# Patient Record
Sex: FEMALE | Race: WHITE | Employment: FULL TIME | URBAN - METROPOLITAN AREA
[De-identification: names, ages, dates, MRNs, and addresses within clinical notes are randomized per-mention and may not be internally consistent; named-entity substitution may affect disease eponyms.]

---

## 2018-06-14 PROBLEM — E66.01 SEVERE OBESITY (BMI 35.0-39.9): Status: ACTIVE | Noted: 2018-06-14

## 2020-06-30 PROBLEM — Z34.01 PRIMIGRAVIDA IN FIRST TRIMESTER: Status: ACTIVE | Noted: 2020-06-23

## 2020-08-14 PROBLEM — U07.1 COVID-19: Status: ACTIVE | Noted: 2020-07-16

## 2020-10-29 PROBLEM — O99.212 OBESITY AFFECTING PREGNANCY IN SECOND TRIMESTER: Status: ACTIVE | Noted: 2018-06-14

## 2020-10-29 PROBLEM — Z34.02 PRIMIGRAVIDA IN SECOND TRIMESTER: Status: ACTIVE | Noted: 2020-06-23

## 2020-10-29 PROBLEM — D25.9 UTERINE FIBROID IN PREGNANCY: Status: ACTIVE | Noted: 2020-10-29

## 2020-10-29 PROBLEM — O34.10 UTERINE FIBROID IN PREGNANCY: Status: ACTIVE | Noted: 2020-10-29

## 2021-01-25 ENCOUNTER — HOSPITAL ENCOUNTER (INPATIENT)
Age: 31
LOS: 3 days | Discharge: HOME | End: 2021-01-28
Attending: OBSTETRICS & GYNECOLOGY | Admitting: OBSTETRICS & GYNECOLOGY
Payer: COMMERCIAL

## 2021-01-25 DIAGNOSIS — Z98.890 POST-OPERATIVE STATE: Primary | ICD-10-CM

## 2021-01-25 DIAGNOSIS — O13.3 GESTATIONAL HYPERTENSION, THIRD TRIMESTER: ICD-10-CM

## 2021-01-25 PROBLEM — O41.03X0 OLIGOHYDRAMNIOS IN THIRD TRIMESTER: Status: ACTIVE | Noted: 2021-01-25

## 2021-01-25 PROCEDURE — 65270000029 HC RM PRIVATE

## 2021-01-25 PROCEDURE — 74011250636 HC RX REV CODE- 250/636: Performed by: OBSTETRICS & GYNECOLOGY

## 2021-01-25 RX ORDER — ZOLPIDEM TARTRATE 5 MG/1
5 TABLET ORAL
Status: DISCONTINUED | OUTPATIENT
Start: 2021-01-25 | End: 2021-01-27

## 2021-01-25 RX ORDER — SODIUM CHLORIDE, SODIUM LACTATE, POTASSIUM CHLORIDE, CALCIUM CHLORIDE 600; 310; 30; 20 MG/100ML; MG/100ML; MG/100ML; MG/100ML
125 INJECTION, SOLUTION INTRAVENOUS CONTINUOUS
Status: DISCONTINUED | OUTPATIENT
Start: 2021-01-25 | End: 2021-01-27

## 2021-01-25 RX ADMIN — SODIUM CHLORIDE, SODIUM LACTATE, POTASSIUM CHLORIDE, AND CALCIUM CHLORIDE 125 ML/HR: 600; 310; 30; 20 INJECTION, SOLUTION INTRAVENOUS at 20:00

## 2021-01-26 ENCOUNTER — APPOINTMENT (OUTPATIENT)
Dept: ULTRASOUND IMAGING | Age: 31
End: 2021-01-26
Attending: OBSTETRICS & GYNECOLOGY
Payer: COMMERCIAL

## 2021-01-26 ENCOUNTER — ANESTHESIA (OUTPATIENT)
Dept: LABOR AND DELIVERY | Age: 31
End: 2021-01-26
Payer: COMMERCIAL

## 2021-01-26 ENCOUNTER — ANESTHESIA EVENT (OUTPATIENT)
Dept: LABOR AND DELIVERY | Age: 31
End: 2021-01-26
Payer: COMMERCIAL

## 2021-01-26 PROBLEM — O13.3 GESTATIONAL HYPERTENSION, THIRD TRIMESTER: Status: ACTIVE | Noted: 2021-01-26

## 2021-01-26 PROBLEM — O41.00X0 OLIGOHYDRAMNIOS ANTEPARTUM: Status: ACTIVE | Noted: 2021-01-26

## 2021-01-26 LAB
ABO + RH BLD: NORMAL
ARTERIAL PATENCY WRIST A: ABNORMAL
ARTERIAL PATENCY WRIST A: ABNORMAL
BASE DEFICIT BLD-SCNC: 3 MMOL/L
BASE DEFICIT BLD-SCNC: 3 MMOL/L
BASOPHILS # BLD: 0 K/UL (ref 0–0.2)
BASOPHILS NFR BLD: 0 % (ref 0–2)
BDY SITE: ABNORMAL
BDY SITE: ABNORMAL
BLOOD GROUP ANTIBODIES SERPL: NORMAL
CO2 BLD-SCNC: 24 MMOL/L
CO2 BLD-SCNC: 28 MMOL/L
COLLECT TIME,HTIME: 1240
COLLECT TIME,HTIME: 1240
DIFFERENTIAL METHOD BLD: ABNORMAL
EOSINOPHIL # BLD: 0 K/UL (ref 0–0.8)
EOSINOPHIL NFR BLD: 0 % (ref 0.5–7.8)
ERYTHROCYTE [DISTWIDTH] IN BLOOD BY AUTOMATED COUNT: 13 % (ref 11.9–14.6)
GAS FLOW.O2 O2 DELIVERY SYS: ABNORMAL L/MIN
GAS FLOW.O2 O2 DELIVERY SYS: ABNORMAL L/MIN
HCO3 BLD-SCNC: 25.9 MMOL/L (ref 22–26)
HCO3 BLDV-SCNC: 22.9 MMOL/L (ref 23–28)
HCT VFR BLD AUTO: 36.3 % (ref 35.8–46.3)
HGB BLD-MCNC: 12.3 G/DL (ref 11.7–15.4)
IMM GRANULOCYTES # BLD AUTO: 0 K/UL (ref 0–0.5)
IMM GRANULOCYTES NFR BLD AUTO: 0 % (ref 0–5)
LYMPHOCYTES # BLD: 2.1 K/UL (ref 0.5–4.6)
LYMPHOCYTES NFR BLD: 19 % (ref 13–44)
MCH RBC QN AUTO: 31 PG (ref 26.1–32.9)
MCHC RBC AUTO-ENTMCNC: 33.9 G/DL (ref 31.4–35)
MCV RBC AUTO: 91.4 FL (ref 79.6–97.8)
MONOCYTES # BLD: 0.6 K/UL (ref 0.1–1.3)
MONOCYTES NFR BLD: 5 % (ref 4–12)
NEUTS SEG # BLD: 8.2 K/UL (ref 1.7–8.2)
NEUTS SEG NFR BLD: 75 % (ref 43–78)
NRBC # BLD: 0 K/UL (ref 0–0.2)
PCO2 BLDCO: 43 MMHG (ref 32–68)
PCO2 BLDCO: 60 MMHG (ref 32–68)
PH BLDCO: 7.24 [PH] (ref 7.15–7.38)
PH BLDCO: 7.34 [PH] (ref 7.15–7.38)
PLATELET # BLD AUTO: 351 K/UL (ref 150–450)
PMV BLD AUTO: 10.5 FL (ref 9.4–12.3)
PO2 BLDCO: 10 MMHG
PO2 BLDCO: 19 MMHG
RBC # BLD AUTO: 3.97 M/UL (ref 4.05–5.2)
SAO2 % BLD: 8 % (ref 95–98)
SAO2 % BLDV: 26 % (ref 65–88)
SERVICE CMNT-IMP: ABNORMAL
SERVICE CMNT-IMP: ABNORMAL
SPECIMEN EXP DATE BLD: NORMAL
SPECIMEN TYPE: ABNORMAL
SPECIMEN TYPE: ABNORMAL
WBC # BLD AUTO: 10.9 K/UL (ref 4.3–11.1)

## 2021-01-26 PROCEDURE — 74011250636 HC RX REV CODE- 250/636: Performed by: REGISTERED NURSE

## 2021-01-26 PROCEDURE — 82803 BLOOD GASES ANY COMBINATION: CPT

## 2021-01-26 PROCEDURE — 76010000392 HC C SECN EA ADDL 0.5 HR: Performed by: OBSTETRICS & GYNECOLOGY

## 2021-01-26 PROCEDURE — 36415 COLL VENOUS BLD VENIPUNCTURE: CPT

## 2021-01-26 PROCEDURE — 74011250636 HC RX REV CODE- 250/636: Performed by: OBSTETRICS & GYNECOLOGY

## 2021-01-26 PROCEDURE — 77030002991 HC SUT QUILL SSPC -B: Performed by: OBSTETRICS & GYNECOLOGY

## 2021-01-26 PROCEDURE — 77030034696 HC CATH URETH FOL 2W BARD -A: Performed by: OBSTETRICS & GYNECOLOGY

## 2021-01-26 PROCEDURE — 2709999900 HC NON-CHARGEABLE SUPPLY

## 2021-01-26 PROCEDURE — 77030039266 HC ADH SKN EXOFIN S2SG -A: Performed by: OBSTETRICS & GYNECOLOGY

## 2021-01-26 PROCEDURE — 74011000250 HC RX REV CODE- 250: Performed by: ANESTHESIOLOGY

## 2021-01-26 PROCEDURE — 86901 BLOOD TYPING SEROLOGIC RH(D): CPT

## 2021-01-26 PROCEDURE — 77030003665 HC NDL SPN BBMI -A: Performed by: ANESTHESIOLOGY

## 2021-01-26 PROCEDURE — 77030032490 HC SLV COMPR SCD KNE COVD -B: Performed by: OBSTETRICS & GYNECOLOGY

## 2021-01-26 PROCEDURE — 75410000003 HC RECOV DEL/VAG/CSECN EA 0.5 HR: Performed by: OBSTETRICS & GYNECOLOGY

## 2021-01-26 PROCEDURE — 76060000078 HC EPIDURAL ANESTHESIA: Performed by: OBSTETRICS & GYNECOLOGY

## 2021-01-26 PROCEDURE — 76818 FETAL BIOPHYS PROFILE W/NST: CPT

## 2021-01-26 PROCEDURE — 77030040830 HC CATH URETH FOL MDII -A

## 2021-01-26 PROCEDURE — 65270000029 HC RM PRIVATE

## 2021-01-26 PROCEDURE — 76819 FETAL BIOPHYS PROFIL W/O NST: CPT | Performed by: OBSTETRICS & GYNECOLOGY

## 2021-01-26 PROCEDURE — 77030002888 HC SUT CHRMC J&J -A: Performed by: OBSTETRICS & GYNECOLOGY

## 2021-01-26 PROCEDURE — 74011250636 HC RX REV CODE- 250/636: Performed by: ANESTHESIOLOGY

## 2021-01-26 PROCEDURE — 77030031139 HC SUT VCRL2 J&J -A: Performed by: OBSTETRICS & GYNECOLOGY

## 2021-01-26 PROCEDURE — 74011000250 HC RX REV CODE- 250: Performed by: REGISTERED NURSE

## 2021-01-26 PROCEDURE — 74011250637 HC RX REV CODE- 250/637: Performed by: ANESTHESIOLOGY

## 2021-01-26 PROCEDURE — 77030007880 HC KT SPN EPDRL BBMI -B: Performed by: ANESTHESIOLOGY

## 2021-01-26 PROCEDURE — 85025 COMPLETE CBC W/AUTO DIFF WBC: CPT

## 2021-01-26 PROCEDURE — 2709999900 HC NON-CHARGEABLE SUPPLY: Performed by: OBSTETRICS & GYNECOLOGY

## 2021-01-26 PROCEDURE — 77030040361 HC SLV COMPR DVT MDII -B

## 2021-01-26 PROCEDURE — 76010000391 HC C SECN FIRST 1 HR: Performed by: OBSTETRICS & GYNECOLOGY

## 2021-01-26 RX ORDER — ONDANSETRON 2 MG/ML
4 INJECTION INTRAMUSCULAR; INTRAVENOUS
Status: ACTIVE | OUTPATIENT
Start: 2021-01-26 | End: 2021-01-27

## 2021-01-26 RX ORDER — NALOXONE HYDROCHLORIDE 0.4 MG/ML
0.2 INJECTION, SOLUTION INTRAMUSCULAR; INTRAVENOUS; SUBCUTANEOUS
Status: ACTIVE | OUTPATIENT
Start: 2021-01-26 | End: 2021-01-27

## 2021-01-26 RX ORDER — DIPHENHYDRAMINE HYDROCHLORIDE 50 MG/ML
12.5 INJECTION, SOLUTION INTRAMUSCULAR; INTRAVENOUS
Status: DISPENSED | OUTPATIENT
Start: 2021-01-26 | End: 2021-01-27

## 2021-01-26 RX ORDER — NALBUPHINE HYDROCHLORIDE 10 MG/ML
5 INJECTION, SOLUTION INTRAMUSCULAR; INTRAVENOUS; SUBCUTANEOUS
Status: DISCONTINUED | OUTPATIENT
Start: 2021-01-26 | End: 2021-01-28 | Stop reason: HOSPADM

## 2021-01-26 RX ORDER — OXYCODONE HYDROCHLORIDE 5 MG/1
10 TABLET ORAL
Status: DISPENSED | OUTPATIENT
Start: 2021-01-26 | End: 2021-01-27

## 2021-01-26 RX ORDER — KETOROLAC TROMETHAMINE 30 MG/ML
INJECTION, SOLUTION INTRAMUSCULAR; INTRAVENOUS AS NEEDED
Status: DISCONTINUED | OUTPATIENT
Start: 2021-01-26 | End: 2021-01-26 | Stop reason: HOSPADM

## 2021-01-26 RX ORDER — EPHEDRINE SULFATE/0.9% NACL/PF 50 MG/5 ML
SYRINGE (ML) INTRAVENOUS AS NEEDED
Status: DISCONTINUED | OUTPATIENT
Start: 2021-01-26 | End: 2021-01-26 | Stop reason: HOSPADM

## 2021-01-26 RX ORDER — TRISODIUM CITRATE DIHYDRATE AND CITRIC ACID MONOHYDRATE 500; 334 MG/5ML; MG/5ML
30 SOLUTION ORAL ONCE
Status: COMPLETED | OUTPATIENT
Start: 2021-01-26 | End: 2021-01-26

## 2021-01-26 RX ORDER — ONDANSETRON 2 MG/ML
INJECTION INTRAMUSCULAR; INTRAVENOUS AS NEEDED
Status: DISCONTINUED | OUTPATIENT
Start: 2021-01-26 | End: 2021-01-26 | Stop reason: HOSPADM

## 2021-01-26 RX ORDER — SODIUM CHLORIDE, SODIUM LACTATE, POTASSIUM CHLORIDE, CALCIUM CHLORIDE 600; 310; 30; 20 MG/100ML; MG/100ML; MG/100ML; MG/100ML
75 INJECTION, SOLUTION INTRAVENOUS CONTINUOUS
Status: DISCONTINUED | OUTPATIENT
Start: 2021-01-26 | End: 2021-01-28

## 2021-01-26 RX ORDER — MORPHINE SULFATE 0.5 MG/ML
INJECTION, SOLUTION EPIDURAL; INTRATHECAL; INTRAVENOUS
Status: COMPLETED | OUTPATIENT
Start: 2021-01-26 | End: 2021-01-26

## 2021-01-26 RX ORDER — OXYTOCIN/RINGER'S LACTATE 30/500 ML
PLASTIC BAG, INJECTION (ML) INTRAVENOUS
Status: DISCONTINUED | OUTPATIENT
Start: 2021-01-26 | End: 2021-01-26 | Stop reason: HOSPADM

## 2021-01-26 RX ORDER — CEFAZOLIN SODIUM/WATER 2 G/20 ML
2 SYRINGE (ML) INTRAVENOUS ONCE
Status: COMPLETED | OUTPATIENT
Start: 2021-01-26 | End: 2021-01-26

## 2021-01-26 RX ORDER — SODIUM CHLORIDE, SODIUM LACTATE, POTASSIUM CHLORIDE, CALCIUM CHLORIDE 600; 310; 30; 20 MG/100ML; MG/100ML; MG/100ML; MG/100ML
INJECTION, SOLUTION INTRAVENOUS
Status: DISCONTINUED | OUTPATIENT
Start: 2021-01-26 | End: 2021-01-26 | Stop reason: HOSPADM

## 2021-01-26 RX ORDER — KETOROLAC TROMETHAMINE 30 MG/ML
30 INJECTION, SOLUTION INTRAMUSCULAR; INTRAVENOUS
Status: DISPENSED | OUTPATIENT
Start: 2021-01-26 | End: 2021-01-27

## 2021-01-26 RX ORDER — BUPIVACAINE HYDROCHLORIDE 7.5 MG/ML
INJECTION, SOLUTION INTRASPINAL
Status: COMPLETED | OUTPATIENT
Start: 2021-01-26 | End: 2021-01-26

## 2021-01-26 RX ORDER — MORPHINE SULFATE 10 MG/ML
5 INJECTION, SOLUTION INTRAMUSCULAR; INTRAVENOUS
Status: ACTIVE | OUTPATIENT
Start: 2021-01-26 | End: 2021-01-27

## 2021-01-26 RX ADMIN — BUPIVACAINE HYDROCHLORIDE IN DEXTROSE 12.5 MG: 7.5 INJECTION, SOLUTION SUBARACHNOID at 12:29

## 2021-01-26 RX ADMIN — Medication 10 MG: at 12:26

## 2021-01-26 RX ADMIN — SODIUM CHLORIDE, SODIUM LACTATE, POTASSIUM CHLORIDE, AND CALCIUM CHLORIDE 500 ML: 600; 310; 30; 20 INJECTION, SOLUTION INTRAVENOUS at 22:41

## 2021-01-26 RX ADMIN — KETOROLAC TROMETHAMINE 30 MG: 30 INJECTION, SOLUTION INTRAMUSCULAR at 19:13

## 2021-01-26 RX ADMIN — NALBUPHINE HYDROCHLORIDE 5 MG: 10 INJECTION, SOLUTION INTRAMUSCULAR; INTRAVENOUS; SUBCUTANEOUS at 23:02

## 2021-01-26 RX ADMIN — PHENYLEPHRINE HYDROCHLORIDE 80 MCG: 10 INJECTION INTRAVENOUS at 12:25

## 2021-01-26 RX ADMIN — Medication 5 MG: at 13:21

## 2021-01-26 RX ADMIN — SODIUM CHLORIDE, SODIUM LACTATE, POTASSIUM CHLORIDE, AND CALCIUM CHLORIDE: 600; 310; 30; 20 INJECTION, SOLUTION INTRAVENOUS at 12:11

## 2021-01-26 RX ADMIN — Medication 2 G: at 12:05

## 2021-01-26 RX ADMIN — KETOROLAC TROMETHAMINE 30 MG: 30 INJECTION, SOLUTION INTRAMUSCULAR at 13:17

## 2021-01-26 RX ADMIN — SODIUM CHLORIDE, SODIUM LACTATE, POTASSIUM CHLORIDE, AND CALCIUM CHLORIDE 125 ML/HR: 600; 310; 30; 20 INJECTION, SOLUTION INTRAVENOUS at 03:45

## 2021-01-26 RX ADMIN — NALBUPHINE HYDROCHLORIDE 5 MG: 10 INJECTION, SOLUTION INTRAMUSCULAR; INTRAVENOUS; SUBCUTANEOUS at 16:47

## 2021-01-26 RX ADMIN — DIPHENHYDRAMINE HYDROCHLORIDE 12.5 MG: 50 INJECTION, SOLUTION INTRAMUSCULAR; INTRAVENOUS at 14:23

## 2021-01-26 RX ADMIN — ONDANSETRON 4 MG: 2 INJECTION INTRAMUSCULAR; INTRAVENOUS at 13:08

## 2021-01-26 RX ADMIN — SODIUM CITRATE AND CITRIC ACID MONOHYDRATE 30 ML: 500; 334 SOLUTION ORAL at 12:04

## 2021-01-26 RX ADMIN — MORPHINE SULFATE 0.15 MG: 0.5 INJECTION, SOLUTION EPIDURAL; INTRATHECAL; INTRAVENOUS at 12:29

## 2021-01-26 RX ADMIN — OXYTOCIN 500 ML/HR: 10 INJECTION, SOLUTION INTRAMUSCULAR; INTRAVENOUS at 12:41

## 2021-01-26 RX ADMIN — PHENYLEPHRINE HYDROCHLORIDE 160 MCG: 10 INJECTION INTRAVENOUS at 13:21

## 2021-01-26 RX ADMIN — OXYCODONE HYDROCHLORIDE 10 MG: 5 TABLET ORAL at 14:23

## 2021-01-26 RX ADMIN — SODIUM CHLORIDE, SODIUM LACTATE, POTASSIUM CHLORIDE, AND CALCIUM CHLORIDE 500 ML: 600; 310; 30; 20 INJECTION, SOLUTION INTRAVENOUS at 12:05

## 2021-01-26 NOTE — CONSULTS
[facility]  Chart Reviewed. BPP read-8/8, STEPHANI=7.5 cm. Now has Gestational HTN. Recommend proceeding with Delivery.

## 2021-01-26 NOTE — ROUTINE PROCESS
SBAR IN Report: Mother Verbal report received from Mikell Dakins RN on this patient, who is now being transferred from L&D (unit) for routine progression of care. The patient is wearing a green \"Anesthesia-Duramorph\" band. Report consisted of patient's Situation, Background, Assessment and Recommendations (SBAR).  ID bands were compared with the identification form, and verified with the patient and transferring nurse. Information from the SBAR and the [place] Report was reviewed with the transferring nurse; opportunity for questions and clarification provided.

## 2021-01-26 NOTE — PROGRESS NOTES
Per MFM recommend proceed with delivery today. Patient Vitals for the past 8 hrs:   Temp Pulse Resp BP   21 0732 98 °F (36.7 °C) 76 18 132/74   21 0621  67  (!) 153/83       Discussed with mom and dad. Proceed today with  section.      Maxine Chisholm MD  12:05 PM

## 2021-01-26 NOTE — L&D DELIVERY NOTE
Delivery Summary    Patient: Yossi Bran MRN: [id number]  SSN: [id number]    YOB: 1990  Age: 35 y.o. Sex: female        Information for the patient's :  Missy Neely [[id number]]       Labor Events:    Labor: No    Steroids:     Cervical Ripening Date/Time:       Cervical Ripening Type: None   Antibiotics During Labor:     Rupture Identifier:      Rupture Date/Time: 2021 12:38 PM   Rupture Type: AROM   Amniotic Fluid Volume: Moderate    Amniotic Fluid Description: Clear    Amniotic Fluid Odor: None    Induction: None       Induction Date/Time:        Indications for Induction:      Augmentation: None   Augmentation Date/Time:      Indications for Augmentation:     Labor complications: Additional complications:        Delivery Events:  Indications For Episiotomy:     Episiotomy:     Perineal Laceration(s):     Repaired:     Periurethral Laceration Location:      Repaired:     Labial Laceration Location:     Repaired:     Sulcal Laceration Location:     Repaired:     Vaginal Laceration Location:     Repaired:     Cervical Laceration Location:     Repaired:     Repair Suture:     Number of Repair Packets:     Estimated Blood Loss (ml):  ml   Quantitaive Blood Loss (ml):             Delivery Date: 2021    Delivery Time: 12:40 PM   Delivery Type: , Low Transverse     Details    Trial of Labor: No   Primary/Repeat: Primary   Priority: Routine   Indications:  Breech       Sex:  Male     Gestational Age: 38w3d  Delivery Clinician:  Isac Solo  Living Status: Living   Delivery Location: OR OR           APGARS  One minute Five minutes Ten minutes   Skin color: 1   1        Heart rate: 2   2        Grimace: 2   2        Muscle tone: 2   2        Breathin   2        Totals: 9   9          Presentation: Breech    Position:        Resuscitation Method:  Suctioning-bulb; Tactile Stimulation     Meconium Stained:        Cord Information: 3 Vessels  Complications: None  Cord around:    Delayed cord clamping? Yes  Cord clamped date/time:   Disposition of Cord Blood: Lab    Blood Gases Sent?: Yes    Placenta:  Date/Time: 2021 12:42 PM  Removal: Expressed      Appearance: Normal;Intact      Measurements:  Birth Weight: 7 lb 14.6 oz (3.59 kg)      Birth Length:        Head Circumference:        Chest Circumference:       Abdominal Girth:       Other Providers:   Hui COLLIER;MIRNA CALIXTO;BRYAN URIBE;GLAIDS LALA;THEODORE PIERCE;CLEMENT BARBOSA;MARCOS RIVERA;NALINI LOPEZ;LYNNETTE BRANNON, Obstetrician;Primary Nurse;Primary [place] Nurse;Respiratory Therapist;Neonatologist;Anesthesiologist;Crna;Scrub Tech;Scrub Tech             Group B Strep: No results found for: Jacqueline Rodriguez  Information for the patient's :  Fiona Robles [[id number]]   No results found for: ABORH, PCTABR, PCTDIG, BILI, ABORHEXT, ABORH     Recent Labs     21  1251 21  1248   PCO2CB 60 43   PO2CB 10 19   HCO3I 25.9  --    SO2I 8*  --    IBD 3 3   SPECTI ARTERIAL CORD VENOUS CORD   PHICB 7.24 7.34   ISITE CORD CORD   IDEV OTHER OTHER   IALLEN NOT APPLICABLE NOT APPLICABLE

## 2021-01-26 NOTE — ANESTHESIA PROCEDURE NOTES
Spinal Block    Start time: 1/26/2021 12:24 PM  End time: 1/26/2021 12:29 PM  Performed by: Clarke Felix MD  Authorized by: Clarke Felix MD     Pre-procedure:   Indications: primary anesthetic  Preanesthetic Checklist: patient identified, risks and benefits discussed, anesthesia consent, site marked, patient being monitored and timeout performed    Timeout Time: 12:24          Spinal Block:   Patient Position:  Seated  Prep Region:  Lumbar  Prep: DuraPrep      Location:  L3-4  Technique:  Single shot    Local Dose (mL):  3    Needle:   Needle Type:  Pencan  Needle Gauge:  25 G  Attempts:  1      Events: CSF confirmed, no blood with aspiration and no paresthesia        Assessment:  Insertion:  Uncomplicated  Patient tolerance:  Patient tolerated the procedure well with no immediate complications

## 2021-01-26 NOTE — PROGRESS NOTES
Admission assessment complete as noted. Patient oriented to room and unit. Plan of care reviewed and patient verbalizes understanding. Questions encouraged and answered. Patent encouraged to call for needs or concerns. Safety Teaching reviewed:   1. Hand hygiene prior to handling the infant. 2. Use of bulb syringe. 3. Bracelets with matching numbers are placed on mother and infant  4. An infant security tag  [facility]) is placed on the infant's ankle and monitored  5. All OB nurses wear pink Employee badges - do not give your baby to anyone without proper identification. 6. Never leave the baby alone in the room. 7. The infant should be placed on their back to sleep. on a firm mattress. No toys should be placed in the crib. (safe sleep video offered to view)  8. Never shake the baby (video offered to view)  9. Infant fall prevention - do not sleep with the baby, and place the baby in the crib while ambulating. 8. Mother and Baby Care booklet given to Mother.

## 2021-01-26 NOTE — PROGRESS NOTES
Morning assessment completed. Pt denies c/o pain or needs at this time. Currently NPO awaiting ultrasound for fluid level and desires c/s today. Instructed pt to call prn, verbalizes understanding.

## 2021-01-26 NOTE — H&P
History & Physical    Name: German Maldonado MRN: [id number]  SSN: [id number]    YOB: 1990  Age: 35 y.o. Sex: female      Subjective:     Reason for Admission:  Pregnancy and Oligohydramnios    History of Present Illness: Ms. Mark Iglesias is a 35 y.o.  female with an estimated gestational age of 36w4d with Estimated Date of Delivery: 21. Had an [address] at the office today. Incidental finding:  STEPHANI 6, BPP 8/. Scheduled for primary  Fri, 21 2/2 breech. Pregnancy has been uncomplicated by. Patient denies vaginal leaking of fluid . OB History    Para Term  AB Living   1 0 0 0 0 0   SAB TAB Ectopic Molar Multiple Live Births   0 0 0 0 0 0      # Outcome Date GA Lbr Davis/2nd Weight Sex Delivery Anes PTL Lv   1 Current              Past Medical History:   Diagnosis Date    Broken foot     right    COVID-19 2020     No past surgical history on file. Social History     Occupational History    Not on file   Tobacco Use    Smoking status: Never Smoker    Smokeless tobacco: Never Used   Substance and Sexual Activity    Alcohol use: No    Drug use: Never    Sexual activity: Yes     Partners: Male     Birth control/protection: None      Family History   Problem Relation Age of Onset    No Known Problems Mother     Hypertension Father     Thyroid Disease Father     Cancer Father         sarcoma    Thyroid Disease Sister     Heart Disease Maternal Grandmother     Elevated Lipids Maternal Grandmother     Heart Disease Maternal Grandfather     Elevated Lipids Maternal Grandfather     Diabetes Neg Hx        No Known Allergies  Prior to Admission medications    Medication Sig Start Date End Date Taking? Authorizing Provider   acetaminophen (Tylenol Extra Strength) 500 mg tablet Take  by mouth every six (6) hours as needed for Pain. Yes Provider, Historical   prenatal multivit-ca-min-fe-fa tab Take  by mouth.    Yes Provider, Historical        Review of Systems:  A comprehensive review of systems was negative except for that written in the History of Present Illness. Objective:     Vitals:    Vitals:    21   BP: (!) 140/88   Pulse: 73   Resp: 18   Temp: 97.6 °F (36.4 °C)      Temp (24hrs), Av.6 °F (36.4 °C), Min:97.6 °F (36.4 °C), Max:97.6 °F (36.4 °C)    BP  Min: 114/68  Max: 140/88     Physical Exam:  Patient without distress. Heart: Regular rate and rhythm, S1S2 present or without murmur or extra heart sounds  Lung: clear to auscultation throughout lung fields, no wheezes, no rales, no rhonchi and normal respiratory effort  Fundus: soft and non tender   Vagina:  No pooling, vaginal secretions Nitrazine neg. Neg ferning. Cervical Exam:  Closed/th/high/posterior/soft  Lower Extremities:  - Edema 1+   - Patellar Reflexes: 1+ bilaterally   - Clonus: absent     Membranes:  Intact        Lab/Data Review:  No results found for this or any previous visit (from the past 24 hour(s)). Assessment and Plan:     Principal Problem:    Oligohydramnios in third trimester (2021)    Active Problems:    Breech presentation (2021)      Overview:       2021; declined version at 37 wks      39 wks on . Dr. Everardo Carolina on call that day. [facility] for  that day per       [facility] and pt       EFW- 8lb2oz 79%, BPD- >99%, HC- 99%. AC 70%  STEPHANI- 6.2CM WITH DVP OF 2.6CM. BPP . BREECH PRESENTATION. 35 y.o.  , 38w3d, breech presentation, oligo. PE today:  Neg pooling, nitrazine, ferning  Per pt, had a busy weekend, was very active. CFM and left tilt BR w/ IVF overnight. Reassess STEPHANI tomorrow. If STEPHANI improves plan primary c-s w/ me this Fri @ 39 wk.      NPO post midnight.     - Oligohydramnios:  See above

## 2021-01-26 NOTE — ANESTHESIA PREPROCEDURE EVALUATION
Relevant Problems   No relevant active problems       Anesthetic History               Review of Systems / Medical History  Patient summary reviewed and pertinent labs reviewed    Pulmonary                   Neuro/Psych              Cardiovascular                  Exercise tolerance: >4 METS     GI/Hepatic/Renal                Endo/Other             Other Findings   Comments: COVID-19 7/2020           Physical Exam    Airway  Mallampati: II  TM Distance: > 6 cm  Neck ROM: normal range of motion   Mouth opening: Normal     Cardiovascular  Regular rate and rhythm,  S1 and S2 normal,  no murmur, click, rub, or gallop             Dental  No notable dental hx       Pulmonary  Breath sounds clear to auscultation               Abdominal         Other Findings            Anesthetic Plan    ASA: 3        Post-op pain plan if not by surgeon: intrathecal opiates      Anesthetic plan and risks discussed with: Patient

## 2021-01-26 NOTE — PROGRESS NOTES
Pt admitted to room 426 for obs. Iv started to left hand with LR running at 125ml/hr. Nst reactive. Consents signed. All pt questions answered at this time.

## 2021-01-26 NOTE — PROGRESS NOTES
Spoke with Dr Nella Mckeon about orders for pt. New orders received for NST BID, IV with LR at 125ml/hr and regular diet.

## 2021-01-26 NOTE — PROGRESS NOTES
SBAR OUT Report: Mother    Verbal report given to Norma Ranch (full name & credentials) on this patient, who is now being transferred to [facility]  for routine progression of care. The patient is not wearing a green \"Anesthesia-Duramorph\" band. Report consisted of patient's Situation, Background, Assessment and Recommendations (SBAR). [place] ID bands were compared with the identification form, and verified with the patient and receiving nurse. Information from the SBAR and the [facility] Report was reviewed with the receiving nurse; opportunity for questions and clarification provided.

## 2021-01-26 NOTE — OP NOTES
Section Delivery Operative Report       Patient: Viktoria Cleaning MRN: [id number]  SSN: [id number]    YOB: 1990  Age: 35 y.o. Sex: female       Date of Procedure: 2021     Preoperative Diagnosis: 38 wks 4 days; borderline oligohydramnios; PIH; breech presentation    Postoperative Diagnosis: same with delivery of viable male infant    Procedure: primary low transverse  section; patient is a  candidate    Anesthesia: Spinal    Prophylactic Antibiotics: ancef    Estimated Blood Loss:      Information for the patient's :  Khadijah Cordero [[id number]]   Delivery of a 7 lb 14.6 oz (3.59 kg) male infant on 2021 at 12:40 PM. Apgars were 9  and 9 . Umbilical Cord: 3 Vessels     Umbilical Cord Events: None     Placenta: Expressed removal with Normal;Intact appearance. Amniotic Fluid Volume: Moderate     Amniotic Fluid Description:  Clear         Specimens:   ID Type Source Tests Collected by Time Destination   1 :  Placenta   Maddy Dinero MD 2021 1250 Discarded               Complications:  none    Procedure Details: The patient was taken to the operating room, where Spinal anesthesia was administered and found to be adequate. Tejeda catheter had been placed using sterile technique. The patient was prepped and draped in the normal sterile fashion. The abdomen was entered using the Pfannenstiel technique. Fascia was incised. The peritoneum was entered sharply well superior to the bladder and extended with blunt dissection. The bladder blade was then inserted. The vesicouterine peritoneum was identified and entered sharply with Metzenbaum scissors. The bladder flap was then created sharply and the bladder blade was reinserted. A low transverse uterine incision was made with the scalpel and extended with blunt dissection. Amniotomy was performed and there was clear fluid. The breech was delivered per routine and then full delivery of the body and head.   The cord was clamped and cut and the baby was handed off to the waiting  care unit staff. Placenta was then expressed from the uterus. The uterus was exteriorized and cleared of all clots and debris. The uterine incision was closed with number 0 vicryl in running locking fashion and a second imbricating stitch . The ovaries and tubes were examined and found to be normal.  The posterior cul-de-sac was irrigated with warm normal saline. Good hemostasis was again reassured and the uterus was returned to the abdomen. The anterior pelvis was irrigated with warm normal saline and good hemostasis was again reassured throughout. Rectus muscles were reapproximated with interrupted figure of 8s with 0 vicryl. The fascia was closed with 0 vicryl in a running fashion. Good hemostasis was assured. The skin was closed with a barbed 3.0 monocryl in a subcuticular fashion. The patient tolerated the procedure well. Sponge, lap, and needle counts were correct times three and the patient and baby were taken to recovery/postpartum room in stable condition.     Signed By: Winsome Alcantara MD     2021

## 2021-01-26 NOTE — ANESTHESIA POSTPROCEDURE EVALUATION
Procedure(s):   SECTION.     spinal    Anesthesia Post Evaluation      Multimodal analgesia: multimodal analgesia used between 6 hours prior to anesthesia start to PACU discharge  Patient location during evaluation: floor  Patient participation: complete - patient participated  Level of consciousness: awake and alert  Pain management: adequate  Airway patency: patent  Anesthetic complications: no  Cardiovascular status: acceptable  Respiratory status: acceptable  Hydration status: acceptable  Post anesthesia nausea and vomiting:  none  Final Post Anesthesia Temperature Assessment:  Hypothermia (<36 degrees C)      INITIAL Post-op Vital signs:   Vitals Value Taken Time   /76 21 1547   Temp 36.4 °C (97.5 °F) 21 1339   Pulse 59 21 1547   Resp 18 21 1547   SpO2 97 % 21 1440

## 2021-01-26 NOTE — PROGRESS NOTES
BPP 8/8, STEPHANI 7.5  Per Dr. Sandra Garrett and sylvia, will schedule primary c/s today  Pt notified and aware; and agreeable

## 2021-01-27 LAB
HCT VFR BLD AUTO: 32.5 % (ref 35.8–46.3)
HGB BLD-MCNC: 10.9 G/DL (ref 11.7–15.4)

## 2021-01-27 PROCEDURE — 65270000029 HC RM PRIVATE

## 2021-01-27 PROCEDURE — 74011250636 HC RX REV CODE- 250/636: Performed by: ANESTHESIOLOGY

## 2021-01-27 PROCEDURE — 2709999900 HC NON-CHARGEABLE SUPPLY

## 2021-01-27 PROCEDURE — 74011250637 HC RX REV CODE- 250/637: Performed by: OBSTETRICS & GYNECOLOGY

## 2021-01-27 PROCEDURE — 74011250637 HC RX REV CODE- 250/637: Performed by: ANESTHESIOLOGY

## 2021-01-27 PROCEDURE — 36415 COLL VENOUS BLD VENIPUNCTURE: CPT

## 2021-01-27 PROCEDURE — 85018 HEMOGLOBIN: CPT

## 2021-01-27 RX ORDER — SODIUM CHLORIDE 0.9 % (FLUSH) 0.9 %
5-40 SYRINGE (ML) INJECTION EVERY 8 HOURS
Status: DISCONTINUED | OUTPATIENT
Start: 2021-01-27 | End: 2021-01-28

## 2021-01-27 RX ORDER — DOCUSATE SODIUM 100 MG/1
100 CAPSULE, LIQUID FILLED ORAL 2 TIMES DAILY
Status: DISCONTINUED | OUTPATIENT
Start: 2021-01-27 | End: 2021-01-28 | Stop reason: HOSPADM

## 2021-01-27 RX ORDER — IBUPROFEN 800 MG/1
800 TABLET ORAL
Status: DISCONTINUED | OUTPATIENT
Start: 2021-01-27 | End: 2021-01-28 | Stop reason: HOSPADM

## 2021-01-27 RX ORDER — DIPHENHYDRAMINE HCL 25 MG
25-50 CAPSULE ORAL
Status: DISCONTINUED | OUTPATIENT
Start: 2021-01-27 | End: 2021-01-28 | Stop reason: HOSPADM

## 2021-01-27 RX ORDER — SODIUM CHLORIDE 0.9 % (FLUSH) 0.9 %
5-40 SYRINGE (ML) INJECTION AS NEEDED
Status: DISCONTINUED | OUTPATIENT
Start: 2021-01-27 | End: 2021-01-28 | Stop reason: HOSPADM

## 2021-01-27 RX ORDER — ONDANSETRON 2 MG/ML
4 INJECTION INTRAMUSCULAR; INTRAVENOUS
Status: DISCONTINUED | OUTPATIENT
Start: 2021-01-27 | End: 2021-01-28 | Stop reason: HOSPADM

## 2021-01-27 RX ORDER — SIMETHICONE 80 MG
80 TABLET,CHEWABLE ORAL
Status: DISCONTINUED | OUTPATIENT
Start: 2021-01-27 | End: 2021-01-28 | Stop reason: HOSPADM

## 2021-01-27 RX ORDER — NALOXONE HYDROCHLORIDE 0.4 MG/ML
0.4 INJECTION, SOLUTION INTRAMUSCULAR; INTRAVENOUS; SUBCUTANEOUS AS NEEDED
Status: DISCONTINUED | OUTPATIENT
Start: 2021-01-27 | End: 2021-01-28 | Stop reason: HOSPADM

## 2021-01-27 RX ORDER — ZOLPIDEM TARTRATE 5 MG/1
10 TABLET ORAL
Status: DISCONTINUED | OUTPATIENT
Start: 2021-01-27 | End: 2021-01-28 | Stop reason: HOSPADM

## 2021-01-27 RX ORDER — OXYCODONE HYDROCHLORIDE 5 MG/1
5-10 TABLET ORAL
Status: DISCONTINUED | OUTPATIENT
Start: 2021-01-27 | End: 2021-01-28 | Stop reason: HOSPADM

## 2021-01-27 RX ORDER — DEXTROSE, SODIUM CHLORIDE, SODIUM LACTATE, POTASSIUM CHLORIDE, AND CALCIUM CHLORIDE 5; .6; .31; .03; .02 G/100ML; G/100ML; G/100ML; G/100ML; G/100ML
1000 INJECTION, SOLUTION INTRAVENOUS CONTINUOUS
Status: DISCONTINUED | OUTPATIENT
Start: 2021-01-27 | End: 2021-01-28

## 2021-01-27 RX ADMIN — OXYCODONE 10 MG: 5 TABLET ORAL at 18:11

## 2021-01-27 RX ADMIN — DOCUSATE SODIUM 100 MG: 100 CAPSULE ORAL at 18:11

## 2021-01-27 RX ADMIN — DIPHENHYDRAMINE HYDROCHLORIDE 12.5 MG: 50 INJECTION, SOLUTION INTRAMUSCULAR; INTRAVENOUS at 04:39

## 2021-01-27 RX ADMIN — IBUPROFEN 800 MG: 800 TABLET, FILM COATED ORAL at 18:11

## 2021-01-27 RX ADMIN — KETOROLAC TROMETHAMINE 30 MG: 30 INJECTION, SOLUTION INTRAMUSCULAR at 07:43

## 2021-01-27 RX ADMIN — OXYCODONE HYDROCHLORIDE 10 MG: 5 TABLET ORAL at 11:35

## 2021-01-27 RX ADMIN — NALBUPHINE HYDROCHLORIDE 5 MG: 10 INJECTION, SOLUTION INTRAMUSCULAR; INTRAVENOUS; SUBCUTANEOUS at 07:43

## 2021-01-27 RX ADMIN — KETOROLAC TROMETHAMINE 30 MG: 30 INJECTION, SOLUTION INTRAMUSCULAR at 01:13

## 2021-01-27 NOTE — PROGRESS NOTES
Chart reviewed - first time parent. SW met with patient/ while social distancing w/appropriate PPE.  provided education on [facility] Postpartum [place] Home Visit. At this time, [facility] is completing this  home visit telephonically due to social distancing. Family would like to participate in program.  Referral will be made at discharge. Patient given informational packet on  mood & anxiety disorders (resources/education). Family denies any additional needs from  at this time. Family has 's contact information should any needs/questions arise.     DIDI Gavin-MARLEN  [address] [facility]   [phone]

## 2021-01-27 NOTE — PROGRESS NOTES
Shift assessment complete as noted. Patient denies needs. Questions encouraged and answered. Encouraged to call for needs or concerns. Verbalizes understanding. no redness/no discharge

## 2021-01-27 NOTE — PROGRESS NOTES
Pt is S/P primary  at 38 4/7 weeks due to oligo, breech. No complaints today. Normal PO pain. No GI/ issues. Lochia < menses. No F/C. VITALS  Patient Vitals for the past 24 hrs:   Temp Pulse Resp BP SpO2   21 1044 97.7 °F (36.5 °C) 71 20 127/74 98 %   21 0710 97.7 °F (36.5 °C) 72 20 125/74 98 %   21 0430 97.6 °F (36.4 °C) 67 20 119/72 98 %   21 2355 98.3 °F (36.8 °C) 80 20 120/68 95 %   21 1950 98.5 °F (36.9 °C) 80 20 139/76 96 %   21 1745 98 °F (36.7 °C) 67 16 114/66    21 1645 98.6 °F (37 °C) 63 16 120/65 97 %   21 1547  (!) 59 18 138/76    21 1440  62  130/60 97 %   21 1435  65 18 131/62 97 %   21 1424  60 18 130/64 97 %   21 1410  (!) 58 18 127/66 97 %   21 1355  64 18 127/65 97 %   21 1339 97.5 °F (36.4 °C) 76 20 (!) 127/56 97 %        CV - RRR  LUNGS - CTA bilaterally  ABD - soft, NABS, appropriate tenderness, incision C/D/I  EXT - tr edema bilaterally    Labs:    Recent Results (from the past 24 hour(s))   CBC WITH AUTOMATED DIFF    Collection Time: 21 11:11 AM   Result Value Ref Range    WBC 10.9 4.3 - 11.1 K/uL    RBC 3.97 (L) 4.05 - 5.2 M/uL    HGB 12.3 11.7 - 15.4 g/dL    HCT 36.3 35.8 - 46.3 %    MCV 91.4 79.6 - 97.8 FL    MCH 31.0 26.1 - 32.9 PG    MCHC 33.9 31.4 - 35.0 g/dL    RDW 13.0 11.9 - 14.6 %    PLATELET 779 927 - 656 K/uL    MPV 10.5 9.4 - 12.3 FL    ABSOLUTE NRBC 0.00 0.0 - 0.2 K/uL    DF AUTOMATED      NEUTROPHILS 75 43 - 78 %    LYMPHOCYTES 19 13 - 44 %    MONOCYTES 5 4.0 - 12.0 %    EOSINOPHILS 0 (L) 0.5 - 7.8 %    BASOPHILS 0 0.0 - 2.0 %    IMMATURE GRANULOCYTES 0 0.0 - 5.0 %    ABS. NEUTROPHILS 8.2 1.7 - 8.2 K/UL    ABS. LYMPHOCYTES 2.1 0.5 - 4.6 K/UL    ABS. MONOCYTES 0.6 0.1 - 1.3 K/UL    ABS. EOSINOPHILS 0.0 0.0 - 0.8 K/UL    ABS. BASOPHILS 0.0 0.0 - 0.2 K/UL    ABS. IMM.  GRANS. 0.0 0.0 - 0.5 K/UL   TYPE & SCREEN    Collection Time: 21 11:11 AM   Result Value Ref Range    Crossmatch Expiration 01/29/2021,2359     ABO/Rh(D) A POSITIVE     Antibody screen NEG    BLOOD GAS, CORD BLOOD    Collection Time: 01/26/21 12:48 PM   Result Value Ref Range    Device: OTHER      pH, cord blood (POC) 7.34 7.15 - 7.38      pCO2 cord blood 43 32 - 68 mmHg    pO2 cord blood 19 mmHg    HCO3, venous (POC) 22.9 (L) 23 - 28 MMOL/L    sO2, venous (POC) 26 (L) 65 - 88 %    Base deficit (POC) 3 mmol/L    Allens test (POC) NOT APPLICABLE      Site CORD      Specimen type (POC) VENOUS CORD      Performed by ParchuckAngelaRT     CO2, POC 24 MMOL/L    COLLECT TIME 1,240     BLOOD GAS, CORD BLOOD    Collection Time: 01/26/21 12:51 PM   Result Value Ref Range    Device: OTHER      pH, cord blood (POC) 7.24 7. 15 - 7.38      pCO2 cord blood 60 32 - 68 mmHg    pO2 cord blood 10 mmHg    HCO3 (POC) 25.9 22 - 26 MMOL/L    sO2 (POC) 8 (L) 95 - 98 %    Base deficit (POC) 3 mmol/L    Allens test (POC) NOT APPLICABLE      Site CORD      Specimen type (POC) ARTERIAL CORD      Performed by ParchuckAngelaRT     CO2, POC 28 MMOL/L    COLLECT TIME 1,240     HGB & HCT    Collection Time: 01/27/21  6:24 AM   Result Value Ref Range    HGB 10.9 (L) 11.7 - 15.4 g/dL    HCT 32.5 (L) 35.8 - 46.3 %       POD #1 LTCS     Pt is breast feeding. No issues or complaints today. Stable. (+) ambulating, voiding.  Routine PP/PO care    Riley Gaming MD  10:56 AM  01/27/21

## 2021-01-27 NOTE — PROGRESS NOTES
Anesthesiology  Post-op Note    Post-op day 1 s/p  via spinal with neuraxial opioids for post-op pain management.    Visit Vitals  /74 (BP 1 Location: Right arm, BP Patient Position: At rest)   Pulse 72   Temp 36.5 °C (97.7 °F)   Resp 20   LMP 2020   SpO2 98%   Breastfeeding Unknown     Airway patent, patient appropriately hydrated and appears euvolemic.  Patient is Alert and oriented.  Pain is well controlled.  Pruritus is moderately controlled.  Nausea is well controlled.  No complaints about back or site of injection.  Motor and sensory function has returned to baseline in lower extremities. Patient is satisfied with anesthetic and reports no complications.  Continue current orders, then initiate surgeon's orders for pain management 24 hours after .  Follow up per surgeon.

## 2021-01-28 VITALS
DIASTOLIC BLOOD PRESSURE: 78 MMHG | HEART RATE: 68 BPM | OXYGEN SATURATION: 98 % | RESPIRATION RATE: 16 BRPM | SYSTOLIC BLOOD PRESSURE: 137 MMHG | TEMPERATURE: 97.9 F

## 2021-01-28 PROCEDURE — 2709999900 HC NON-CHARGEABLE SUPPLY

## 2021-01-28 PROCEDURE — 74011250637 HC RX REV CODE- 250/637: Performed by: OBSTETRICS & GYNECOLOGY

## 2021-01-28 RX ORDER — OXYCODONE HYDROCHLORIDE 5 MG/1
5 TABLET ORAL
Qty: 30 TAB | Refills: 0 | Status: SHIPPED | OUTPATIENT
Start: 2021-01-28 | End: 2021-02-02

## 2021-01-28 RX ORDER — IBUPROFEN 800 MG/1
800 TABLET ORAL
Qty: 60 TAB | Refills: 2 | Status: SHIPPED | OUTPATIENT
Start: 2021-01-28 | End: 2021-02-09

## 2021-01-28 RX ADMIN — OXYCODONE 5 MG: 5 TABLET ORAL at 13:19

## 2021-01-28 RX ADMIN — IBUPROFEN 800 MG: 800 TABLET, FILM COATED ORAL at 00:12

## 2021-01-28 RX ADMIN — IBUPROFEN 800 MG: 800 TABLET, FILM COATED ORAL at 12:11

## 2021-01-28 RX ADMIN — DOCUSATE SODIUM 100 MG: 100 CAPSULE ORAL at 12:11

## 2021-01-28 RX ADMIN — IBUPROFEN 800 MG: 800 TABLET, FILM COATED ORAL at 06:20

## 2021-01-28 NOTE — PROGRESS NOTES
Patient discharged to home per MD orders. Discharge instructions reviewed with patient. Questions encouraged and answered. Patient verbalizes understanding.           Patient to call out when ready to be escorted out

## 2021-01-28 NOTE — PROGRESS NOTES
Post-Operative Day Number 2 Progress Note    Patient doing well post-op day 2 from  delivery without significant complaints. Pain controlled on current medication. Voiding without difficulty, normal lochia. Passing flatus. Vitals:    Patient Vitals for the past 8 hrs:   BP Temp Pulse Resp SpO2   21 0651 137/78 97.9 °F (36.6 °C) 68 16 98 %   21 0515 (!) 143/72 97.7 °F (36.5 °C) 73 16      Temp (24hrs), Av.8 °F (36.6 °C), Min:97.7 °F (36.5 °C), Max:97.9 °F (36.6 °C)      Vital signs stable, afebrile. Exam:  Patient without distress. Abdomen soft, fundus firm at level of umbilicus, approp tender. Incision dry and clean without erythema, intact with dermabond               Lower extremities are negative for swelling, cords or tenderness. Lab/Data Review: All lab results for the last 24 hours reviewed. Assessment and Plan:  Patient appears to be having uncomplicated post- course. Continue routine post-op care and maternal education. Desires discharge today - DC instructions reviewed.

## 2021-01-28 NOTE — DISCHARGE SUMMARY
Obstetrical Discharge Summary     Name: Valentin Mujica MRN: [id number]  SSN: [id number]    YOB: 1990  Age: 35 y.o. Sex: female      Allergies: Patient has no known allergies. Admit Date: 2021    Discharge Date: 2021     Admitting Physician: Pardeep Kelly MD     Attending Physician:  Otis Delacruz MD     * Admission Diagnoses: Oligohydramnios antepartum [O41.00X0]    * Discharge Diagnoses:   Information for the patient's :  Mary Hopson [[id number]]   Delivery of a 7 lb 14.6 oz (3.59 kg) male infant via , Low Transverse on 2021 at 12:40 PM  by Pardeep Kelly. Apgars were 9  and 9 . Additional Diagnoses:   Hospital Problems as of 2021 Date Reviewed: 2021          Codes Class Noted - Resolved POA    Oligohydramnios antepartum ICD-10-CM: O41.00X0  ICD-9-CM: 658.03  2021 - Present Unknown        Gestational hypertension, third trimester ICD-10-CM: O13.3  ICD-9-CM: 642.33  2021 - Present Unknown    Overview Signed 2021  3:53 PM by Francisca Pickard MD     2021 Inpatient [facility]             * (Principal) Oligohydramnios in third trimester ICD-10-CM: O41.03X0  ICD-9-CM: 658.03  2021 - Present Unknown        Breech presentation ICD-10-CM: O32. 1XX0  ICD-9-CM: 652.20  2021 - Present Yes    Overview Signed 2021  6:46 PM by Otis Delacruz MD       2021; declined version at 37 wks  39 wks on . Dr. Roberts Inglis on call that day. Charan Pennington for  that day per [facility] and pt                  Lab Results   Component Value Date/Time    ABO/Rh(D) A POSITIVE 2021 11:11 AM      Immunization History   Administered Date(s) Administered    Tdap 2021       * Procedures: PLTCD  Procedure(s) with comments:   SECTION - kiarra 2021 Primary  Section BREECH           * Discharge Condition: good    * Hospital Course: Normal hospital course following the delivery.     * Disposition: Home    Discharge Medications:   Current Discharge Medication List      START taking these medications    Details   ibuprofen (MOTRIN) 800 mg tablet Take 1 Tab by mouth every six (6) hours as needed for Pain. Qty: 60 Tab, Refills: 2      oxyCODONE IR (ROXICODONE) 5 mg immediate release tablet Take 1 Tab by mouth every four (4) hours as needed for Pain for up to 5 days. Max Daily Amount: 30 mg.  Qty: 30 Tab, Refills: 0    Associated Diagnoses: Post-operative state         CONTINUE these medications which have NOT CHANGED    Details   acetaminophen (Tylenol Extra Strength) 500 mg tablet Take  by mouth every six (6) hours as needed for Pain.      prenatal multivit-ca-min-fe-fa tab Take  by mouth. * Follow-up Care/Patient Instructions: Activity: Activity as tolerated, No lifting, Driving, or Strenuous exercise for 6 weeks No sex for 6 weeks and nothing in the vagina during that time. No driving while on narcotic analgesics. Diet: Regular Diet  Wound Care: Keep wound clean and pat dry after shower    Follow-up Information     Follow up With Specialties Details Why Contact Info    Jaelyn Devlin MD [facility]   [address] [place] [address],[address] [place] [address]  [phone]      Cheyanne Juarez 7 Gynecology, Gynecology, Obstetrics In 2 weeks call office to schedule an appointment to be seen in 2 weeks [facility] [address]  [phone]                        .

## 2021-01-28 NOTE — DISCHARGE INSTRUCTIONS
Patient Education        After Your Delivery (the Postpartum Period): Care Instructions  Your Care Instructions     Congratulations on the birth of your baby. Like pregnancy, the  period can be a time of excitement, latisha, and exhaustion. You may look at your wondrous little baby and feel happy. You may also be overwhelmed by your new sleep hours and new responsibilities. At first, babies often sleep during the days and are awake at night. They do not have a pattern or routine. They may make sudden gasps, jerk themselves awake, or look like they have crossed eyes. These are all normal, and they may even make you smile. In these first weeks after delivery, try to take good care of yourself. It may take 4 to 6 weeks to feel like yourself again, and possibly longer if you had a  birth. You will likely feel very tired for several weeks. Your days will be full of ups and downs, but lots of latisha as well. Follow-up care is a key part of your treatment and safety. Be sure to make and go to all appointments, and call your doctor if you are having problems. It's also a good idea to know your test results and keep a list of the medicines you take. How can you care for yourself at home? Take care of your body after delivery  · Use pads instead of tampons for the bloody flow that may last as long as 2 weeks. · Ease cramps with ibuprofen (Advil, Motrin). · Ease soreness of hemorrhoids and the area between your vagina and rectum with ice compresses or witch hazel pads. · Ease constipation by drinking lots of fluid and eating high-fiber foods. Ask your doctor about over-the-counter stool softeners. · Cleanse yourself with a gentle squeeze of warm water from a bottle instead of wiping with toilet paper. · Take a sitz bath in warm water several times a day. · Wear a good nursing bra. Ease sore and swollen breasts with warm, wet washcloths.   · If you are not breastfeeding, use ice rather than heat for breast soreness. · Your period may not start for several months if you are breastfeeding. You may bleed more, and longer at first, than you did before you got pregnant. · Wait until you are healed (about 4 to 6 weeks) before you have sexual intercourse. Your doctor will tell you when it is okay to have sex. · Try not to travel with your baby for 5 or 6 weeks. If you take a long car trip, make frequent stops to walk around and stretch. Avoid exhaustion  · Rest every day. Try to nap when your baby naps. · Ask another adult to be with you for a few days after delivery. · Plan for  if you have other children. · Stay flexible so you can eat at odd hours and sleep when you need to. Both you and your baby are making new schedules. · Plan small trips to get out of the house. Change can make you feel less tired. · Ask for help with housework, cooking, and shopping. Remind yourself that your job is to care for your baby. Know about help for postpartum depression  · \"Baby blues\" are common for the first 1 to 2 weeks after birth. You may cry or feel sad or irritable for no reason. · Rest whenever you can. Being tired makes it harder to handle your emotions. · Go for walks with your baby. · Talk to your partner, friends, and family about your feelings. · If your symptoms last for more than a few weeks, or if you feel very depressed, ask your doctor for help. · Postpartum depression can be treated. Support groups and counseling can help. Sometimes medicine can also help. Stay healthy  · Eat healthy foods so you have more energy and lose extra baby pounds. · If you breastfeed, avoid drugs. If you quit smoking during pregnancy, try to stay smoke-free. If you choose to have a drink now and then, have only one drink, and limit the number of occasions that you have a drink. Wait to breastfeed at least 2 hours after you have a drink to reduce the amount of alcohol the baby may get in the milk.   · Start daily exercise after 4 to 6 weeks, but rest when you feel tired. · Learn exercises to tone your belly. Do Kegel exercises to regain strength in your pelvic muscles. You can do these exercises while you stand or sit. ? Squeeze the same muscles you would use to stop your urine. Your belly and thighs should not move. ? Hold the squeeze for 3 seconds, and then relax for 3 seconds. ? Start with 3 seconds. Then add 1 second each week until you are able to squeeze for 10 seconds. ? Repeat the exercise 10 to 15 times for each session. Do three or more sessions each day. · Find a class for new mothers and new babies that has an exercise time. · If you had a  birth, give yourself a bit more time before you exercise, and be careful. When should you call for help? Call  911 anytime you think you may need emergency care. For example, call if:    · You have thoughts of harming yourself, your baby, or another person.     · You passed out (lost consciousness).     · You have chest pain, are short of breath, or cough up blood.     · You have a seizure. Call your doctor now or seek immediate medical care if:    · You have severe vaginal bleeding. This means you are passing blood clots and soaking through a pad each hour for 2 or more hours.     · You are dizzy or lightheaded, or you feel like you may faint.     · You have a fever.     · You have new or more belly pain.     · You have signs of a blood clot in your leg (called a deep vein thrombosis), such as:  ? Pain in the calf, back of the knee, thigh, or groin. ? Redness and swelling in your leg or groin.     · You have signs of preeclampsia, such as:  ? Sudden swelling of your face, hands, or feet. ? New vision problems (such as dimness, blurring, or seeing spots). ? A severe headache.    Watch closely for changes in your health, and be sure to contact your doctor if:    · Your vaginal bleeding seems to be getting heavier.     · You have new or worse vaginal discharge.     · You feel sad, anxious, or hopeless for more than a few days.     · You do not get better as expected. Where can you learn more? Go to http://www.[employer].com/  Enter A461 in the search box to learn more about \"After Your Delivery (the Postpartum Period): Care Instructions. \"  Current as of: February 11, 2020               Content Version: 12.6  © 8248-6362 [employer]. Care instructions adapted under license by [employer] (which disclaims liability or warranty for this information). If you have questions about a medical condition or this instruction, always ask your healthcare professional. [place] [address] any warranty or liability for your use of this information. Patient Education        Learning About Preeclampsia After Childbirth  What is preeclampsia? A woman with preeclampsia has blood pressure that is higher than usual. She may also have other serious symptoms. Preeclampsia can be dangerous. When it is severe, it can cause seizures (eclampsia) or liver or kidney damage. When the liver is affected, some women get HELLP syndrome, a blood-clotting and bleeding problem. HELLP can come on quickly and can be deadly. This is why your doctor checks you and your baby often. Preeclampsia usually occurs after 20 weeks of pregnancy. Most often, it starts near the end of pregnancy and goes away after childbirth. But symptoms may last a few weeks or more and can get worse after delivery. Rarely, symptoms of preeclampsia don't show up until days or even weeks after childbirth. What are the symptoms? Mild preeclampsia usually doesn't cause symptoms. But preeclampsia can cause rapid weight gain and sudden swelling of the hands and face. Severe preeclampsia does cause symptoms. It can cause a very bad headache and trouble seeing and breathing. It also can cause belly pain.  You may also urinate less than usual.  If you have new preeclampsia symptoms after you go home from the hospital, call your doctor right away. What can you expect after you have had preeclampsia? In the hospital  After the baby and the placenta are delivered, preeclampsia usually starts to improve. Most women get better in the first few days after childbirth. After having preeclampsia, you still have a risk of seizures for a day or more after childbirth. (Very rarely, seizures happen later on.) So your doctor may have you take magnesium sulfate for a day or more to prevent seizures. You may also take medicine to lower your blood pressure. When you go home  Your blood pressure will most likely return to normal a few days after delivery. Your doctor will want to check your blood pressure sometime in the first week after you leave the hospital.  Some women still have high blood pressure 6 weeks after childbirth. But most return to normal levels over the long term. · Take and record your blood pressure at home if your doctor tells you to. ? Learn the importance of the two measures of blood pressure (such as 120 over 80, or 120/80). The first number is the systolic pressure. This is the force of blood on the artery walls as the heart pumps. The second number is the diastolic pressure. This is the force of blood on the artery walls between heartbeats, when the heart is at rest. You have a choice of monitors to use. § Manual monitor: You pump up the cuff and use a stethoscope to listen for your pulse. § Electronic monitor: The cuff inflates, and a gauge shows your pulse rate. ? To take your blood pressure:  § Ask your doctor to check your blood pressure monitor to be sure that it is accurate and that the cuff fits you. Also ask your doctor to watch you use it, to make sure that you are using it right.   § You should not eat, use tobacco products, or use medicine known to raise blood pressure (such as some nasal decongestant sprays) before you take your blood pressure. § Avoid taking your blood pressure if you have just exercised. Also avoid taking it if you are nervous or upset. Rest at least 15 minutes before you take your blood pressure. · Be safe with medicines. If you take medicine, take it exactly as prescribed. Call your doctor if you think you are having a problem with your medicine. · Do not smoke. Quitting smoking will help improve your baby's growth and health. If you need help quitting, talk to your doctor about stop-smoking programs and medicines. These can increase your chances of quitting for good. · Eat a balanced and healthy diet that has lots of fruits and vegetables. Long-term health  After you have had preeclampsia, you have a higher-than-average risk of heart disease, stroke, and kidney disease. This may be because the same things that cause preeclampsia also cause heart and kidney disease. To protect your health, work with your doctor on living a heart-healthy lifestyle and getting the checkups you need. Your doctor may also want you to check your blood pressure at home. Follow-up care is a key part of your treatment and safety. Be sure to make and go to all appointments, and call your doctor if you are having problems. It's also a good idea to know your test results and keep a list of the medicines you take. Where can you learn more? Go to http://www."[employer].com/  Enter Q718 in the search box to learn more about \"Learning About Preeclampsia After Childbirth. \"  Current as of: February 11, 2020               Content Version: 12.6  © 4777-0927 [employer]. Care instructions adapted under license by [employer] (which disclaims liability or warranty for this information). If you have questions about a medical condition or this instruction, always ask your healthcare professional. [place] [address] any warranty or liability for your use of this information.                DISCHARGE SUMMARY from Nurse    PATIENT INSTRUCTIONS:    After general anesthesia or intravenous sedation, for 24 hours or while taking prescription Narcotics:  · Limit your activities  · Do not drive and operate hazardous machinery  · Do not make important personal or business decisions  · Do  not drink alcoholic beverages  · If you have not urinated within 8 hours after discharge, please contact your surgeon on call. Report the following to your surgeon:  · Excessive pain, swelling, redness or odor of or around the surgical area  · Temperature over 100.5  · Nausea and vomiting lasting longer than 4 hours or if unable to take medications  · Any signs of decreased circulation or nerve impairment to extremity: change in color, persistent  numbness, tingling, coldness or increase pain  · Any questions    What to do at Home:    Discharge instruction to follow: Activity: Pelvis rest for 6 weeks, Nothing in vagina for 6 weeks     No heavy lifting over 15 lbs for 2 weeks     No driving for 2 weeks, NO driving if taking narcotics     No push/pull motion such as sweeping or vacuuming for 2 weeks     No tub baths or swimming for 6 weeks     section keep incision clean and dry, may shower as normal with soap and water. Inspect incision every day for signs of infection listed below. Continue to use jelani-bottle with every void or bowel movement until comfortable stopping. Change sanitary pad after each urination or bowel movement. Call MD for the following:      Fever over 101 F; pain not relieved by medication; foul smelling vaginal discharge or increase in vaginal bleeding. Redness, swelling, or drainage from  incision. Nothing in the vagina for 6 weeks. No heavy lifting greater than 20 lbs for 4 weeks.  Call MD if experiencing heavy vaginal bleeding greater than 1 pad per hour, temperature over 100.4, foul smelling vaginal discharge, thoughts of suicide or homicide, symptoms of postpartum depression, or any other major medical concerns.'        Take medication as prescribed. Follow up with MD as order. *  Please give a list of your current medications to your Primary Care Provider. *  Please update this list whenever your medications are discontinued, doses are      changed, or new medications (including over-the-counter products) are added. *  Please carry medication information at all times in case of emergency situations. These are general instructions for a healthy lifestyle:    No smoking/ No tobacco products/ Avoid exposure to second hand smoke  Surgeon General's Warning:  Quitting smoking now greatly reduces serious risk to your health. Obesity, smoking, and sedentary lifestyle greatly increases your risk for illness    A healthy diet, regular physical exercise & weight monitoring are important for maintaining a healthy lifestyle    You may be retaining fluid if you have a history of heart failure or if you experience any of the following symptoms:  Weight gain of 3 pounds or more overnight or 5 pounds in a week, increased swelling in our hands or feet or shortness of breath while lying flat in bed. Please call your doctor as soon as you notice any of these symptoms; do not wait until your next office visit. The discharge information has been reviewed with the patient. The patient verbalized understanding. Discharge medications reviewed with the patient and appropriate educational materials and side effects teaching were provided.   ___________________________________________________________________________________________________________________________________

## 2021-03-16 ENCOUNTER — PATIENT OUTREACH (OUTPATIENT)
Dept: OTHER | Age: 31
End: 2021-03-16

## 2021-03-16 NOTE — PROGRESS NOTES
Patient eligible for [facility] contacted the patient by telephone to discuss the maternity management program.  Patient agrees to care management services at this time. Verified name and  with patient as identifiers. Call within 2 business days of discharge: No     Last Discharge [address]       Complaint Diagnosis Description Type Department Provider    21  Post-operative state . .. Admission (Discharged) Margarita So MD          Risk Factors Identified: c/section for breech primip    Needs to be reviewed by the provider   none         Method of communication with provider : none    Does patient have OB/Gyn Selected? Yes    Discussed follow up appointments. If no appointment was previously scheduled, appointment scheduling offered: Yes  [facility] follow up appointment(s): No future appointments. pt had 6 week appt last week  Non-[facility] follow up appointment(s): n/a    OB History:   OB History    Para Term  AB Living   1 1 1 0 0 1   SAB TAB Ectopic Molar Multiple Live Births   0 0 0 0 0 1      # Outcome Date GA Lbr Davis/2nd Weight Sex Delivery Anes PTL Lv   1 Term 21 38w4d  7 lb 14.6 oz (3.59 kg) M CS-LTranv Spinal N ANTONIO      Complications: Breech presentation, PIH (pregnancy induced hypertension), third trimester      Obstetric Comments   1. Will - PIH       Unknown    Medication reconciliation was performed with patient, who verbalizes understanding of administration of home medications. Advised obtaining a 90-day supply of all daily and as-needed medications. Barriers/Support system:  spouse   Return to work planning?  Yes    Postpartum Assessment:  , Lochia-no bleeding, Incision-healed well, Breast pain No, Depression or feelings of sadness or overwhelm-none to report pt has script for some zoloft for some anxious feelings she was having and now she is feeling much better and did not have to take of the medications and Infant's weight. Baby boy doing well no concerns for this baby. Pt had no additional questions or concerns. Discussed bwwb program, pt did not have any additional needs. Will resolve episode. Patient stated delivery experience: very good  Risk factors for ED visit or readmission: none    Patient given an opportunity to ask questions and does not have any further questions or concerns at this time. Were discharge instructions available to patient? Yes. Reviewed appropriate site of care based on symptoms and resources available to patient including: When to call 911. The patient agrees to contact the OB/Gyn office for questions related to their healthcare.

## 2021-03-16 NOTE — PROGRESS NOTES
ACM attempted to reach patient for Maternity CM call- [facility]. HIPAA compliant message left requesting a return phone call at patients convenience. Will continue to follow. Delivery of a 7 lb 14.6 oz (3.59 kg) male infant via , Low Transverse on 2021 at 12:40 PM  by Arnaldo Henderson. Apgars were 9  and 9 .     POST-OP CHECK 21  LAST OFFICE VISIT 3/10/21

## 2021-03-16 NOTE — PATIENT INSTRUCTIONS
Your [place] [facility]: Care Instructions Your Care Instructions During your baby's first few weeks, you will spend most of your time feeding, diapering, and comforting your baby. You may feel overwhelmed at times. It is normal to wonder if you know what you are doing, especially if you are first-time parents.  care gets easier with every day. Soon you will know what each cry means and be able to figure out what your baby needs and wants. Follow-up care is a key part of your child's treatment and safety. Be sure to make and go to all appointments, and call your doctor if your child is having problems. It's also a good idea to know your child's test results and keep a list of the medicines your child takes. How can you care for your child at home? Feeding · Feed your baby on demand. This means that you should breastfeed or bottle-feed your baby whenever he or she seems hungry. Do not set a schedule. · During the first 2 weeks, your baby will breastfeed at least 8 times in a 24-hour period. Formula-fed babies may need fewer feedings, at least 6 every 24 hours. · These early feedings often are short. Sometimes, a  nurses or drinks from a bottle only for a few minutes. Feedings gradually will last longer. · You may have to wake your sleepy baby to feed in the first few days after birth. Sleeping · Always put your baby to sleep on his or her back, not the stomach. This lowers the risk of sudden infant death syndrome (SIDS). · Most babies sleep for a total of 18 hours each day. They wake for a short time at least every 2 to 3 hours. · Newborns have some moments of active sleep. The baby may make sounds or seem restless. This happens about every 50 to 60 minutes and usually lasts a few minutes. · At first, your baby may sleep through loud noises. Later, noises may wake your baby. · When your  wakes up, he or she usually will be hungry and will need to be fed.  
Diaper changing and bowel habits · Try to check your baby's diaper at least every 2 hours. If it needs to be changed, do it as soon as you can. That will help prevent diaper rash. · Your 's wet and soiled diapers can give you clues about your baby's health. Babies can become dehydrated if they're not getting enough breast milk or formula or if they lose fluid because of diarrhea, vomiting, or a fever. · For the first few days, your baby may have about 3 wet diapers a day. After that, expect 6 or more wet diapers a day throughout the first month of life. It can be hard to tell when a diaper is wet if you use disposable diapers. If you cannot tell, put a piece of tissue in the diaper. It will be wet when your baby urinates. · Keep track of what bowel habits are normal or usual for your child. Umbilical cord care · Keep your baby's diaper folded below the stump. If that doesn't work well, before you put the diaper on your baby, cut out a small area near the top of the diaper to keep the cord open to air. · To keep the cord dry, give your baby a sponge bath instead of bathing your baby in a tub or sink. The stump should fall off within a week or two. When should you call for help? Call your baby's doctor now or seek immediate medical care if: 
  · Your baby has a rectal temperature that is less than 97.5°F (36.4°C) or is 100.4°F (38°C) or higher. Call if you cannot take your baby's temperature but he or she seems hot.  
  · Your baby has no wet diapers for 6 hours.  
  · Your baby's skin or whites of the eyes gets a brighter or deeper yellow.  
  · You see pus or red skin on or around the umbilical cord stump. These are signs of infection.   
Watch closely for changes in your child's health, and be sure to contact your doctor if: 
  · Your baby is not having regular bowel movements based on his or her age.  
  · Your baby cries in an unusual way or for an unusual length of time.  
  · Your baby is rarely awake and does not wake up for feedings, is very fussy, seems too tired to eat, or is not interested in eating. Where can you learn more? Go to http://www.gray.com/ Enter X967 in the search box to learn more about \"Your [facility]: Care Instructions. \" Current as of: May 27, 2020               Content Version: 12.6 © 9118-5351 [employer]. Care instructions adapted under license by [employer] (which disclaims liability or warranty for this information). If you have questions about a medical condition or this instruction, always ask your healthcare professional. [place] [address] any warranty or liability for your use of this information.

## 2022-03-18 PROBLEM — O34.10 UTERINE FIBROID IN PREGNANCY: Status: ACTIVE | Noted: 2020-10-29

## 2022-03-18 PROBLEM — O13.3 GESTATIONAL HYPERTENSION, THIRD TRIMESTER: Status: ACTIVE | Noted: 2021-01-26

## 2022-03-18 PROBLEM — O41.03X0 OLIGOHYDRAMNIOS IN THIRD TRIMESTER: Status: ACTIVE | Noted: 2021-01-25

## 2022-03-18 PROBLEM — D25.9 UTERINE FIBROID IN PREGNANCY: Status: ACTIVE | Noted: 2020-10-29

## 2022-03-19 PROBLEM — O41.00X0 OLIGOHYDRAMNIOS ANTEPARTUM: Status: ACTIVE | Noted: 2021-01-26

## 2022-03-19 PROBLEM — O99.212 OBESITY AFFECTING PREGNANCY IN SECOND TRIMESTER: Status: ACTIVE | Noted: 2018-06-14

## 2022-03-19 PROBLEM — Z34.02 PRIMIGRAVIDA IN SECOND TRIMESTER: Status: ACTIVE | Noted: 2020-06-23

## 2022-03-20 PROBLEM — U07.1 COVID-19: Status: ACTIVE | Noted: 2020-07-16

## 2022-03-22 PROBLEM — O34.10 UTERINE FIBROID IN PREGNANCY: Status: RESOLVED | Noted: 2020-10-29 | Resolved: 2022-03-22

## 2022-03-22 PROBLEM — D25.9 UTERINE FIBROID IN PREGNANCY: Status: RESOLVED | Noted: 2020-10-29 | Resolved: 2022-03-22

## 2022-03-22 PROBLEM — O41.03X0 OLIGOHYDRAMNIOS IN THIRD TRIMESTER: Status: RESOLVED | Noted: 2021-01-25 | Resolved: 2022-03-22

## 2022-03-22 PROBLEM — O41.00X0 OLIGOHYDRAMNIOS ANTEPARTUM: Status: RESOLVED | Noted: 2021-01-26 | Resolved: 2022-03-22

## 2022-03-22 PROBLEM — Z34.02 PRIMIGRAVIDA IN SECOND TRIMESTER: Status: RESOLVED | Noted: 2020-06-23 | Resolved: 2022-03-22

## 2022-03-22 PROBLEM — O13.3 GESTATIONAL HYPERTENSION, THIRD TRIMESTER: Status: RESOLVED | Noted: 2021-01-26 | Resolved: 2022-03-22

## 2022-03-22 PROBLEM — O99.212 OBESITY AFFECTING PREGNANCY IN SECOND TRIMESTER: Status: RESOLVED | Noted: 2018-06-14 | Resolved: 2022-03-22

## 2022-03-24 PROBLEM — Z34.02 PRIMIGRAVIDA IN SECOND TRIMESTER: Status: RESOLVED | Noted: 2020-06-23 | Resolved: 2022-03-22

## 2022-03-24 PROBLEM — O41.03X0 OLIGOHYDRAMNIOS IN THIRD TRIMESTER: Status: RESOLVED | Noted: 2021-01-25 | Resolved: 2022-03-22

## 2022-03-24 PROBLEM — O34.10 UTERINE FIBROID IN PREGNANCY: Status: RESOLVED | Noted: 2020-10-29 | Resolved: 2022-03-22

## 2022-03-24 PROBLEM — O41.00X0 OLIGOHYDRAMNIOS ANTEPARTUM: Status: RESOLVED | Noted: 2021-01-26 | Resolved: 2022-03-22

## 2022-03-24 PROBLEM — O13.3 GESTATIONAL HYPERTENSION, THIRD TRIMESTER: Status: RESOLVED | Noted: 2021-01-26 | Resolved: 2022-03-22

## 2022-03-24 PROBLEM — D25.9 UTERINE FIBROID IN PREGNANCY: Status: RESOLVED | Noted: 2020-10-29 | Resolved: 2022-03-22

## 2022-03-24 PROBLEM — O99.212 OBESITY AFFECTING PREGNANCY IN SECOND TRIMESTER: Status: RESOLVED | Noted: 2018-06-14 | Resolved: 2022-03-22

## 2022-06-27 RX ORDER — PHENTERMINE HYDROCHLORIDE 37.5 MG/1
37.5 TABLET ORAL
Qty: 30 TABLET | Refills: 0 | Status: CANCELLED | OUTPATIENT
Start: 2022-06-27 | End: 2022-07-27

## 2022-06-28 ENCOUNTER — OFFICE VISIT (OUTPATIENT)
Dept: FAMILY MEDICINE CLINIC | Facility: CLINIC | Age: 32
End: 2022-06-28
Payer: COMMERCIAL

## 2022-06-28 VITALS
HEART RATE: 72 BPM | DIASTOLIC BLOOD PRESSURE: 83 MMHG | TEMPERATURE: 98.1 F | WEIGHT: 196 LBS | OXYGEN SATURATION: 99 % | BODY MASS INDEX: 34.73 KG/M2 | HEIGHT: 63 IN | RESPIRATION RATE: 18 BRPM | SYSTOLIC BLOOD PRESSURE: 118 MMHG

## 2022-06-28 DIAGNOSIS — F98.8 ATTENTION DEFICIT DISORDER (ADD) WITHOUT HYPERACTIVITY: Primary | ICD-10-CM

## 2022-06-28 PROCEDURE — 99213 OFFICE O/P EST LOW 20 MIN: CPT | Performed by: FAMILY MEDICINE

## 2022-06-28 RX ORDER — DEXTROAMPHETAMINE SACCHARATE, AMPHETAMINE ASPARTATE MONOHYDRATE, DEXTROAMPHETAMINE SULFATE AND AMPHETAMINE SULFATE 2.5; 2.5; 2.5; 2.5 MG/1; MG/1; MG/1; MG/1
10 CAPSULE, EXTENDED RELEASE ORAL EVERY MORNING
Qty: 30 CAPSULE | Refills: 0 | Status: SHIPPED | OUTPATIENT
Start: 2022-06-28 | End: 2022-07-12 | Stop reason: ALTCHOICE

## 2022-06-29 NOTE — PROGRESS NOTES
Subjective:      Patient ID: [place] is a 28 y.o. female. HPI   Patient comes in today for follow-up on some difficulty focusing. For her employment she is having to take some certification classes and is having a difficult time focusing for that. Has struggled with focus even when she was younger in school found that she was distracted easily. She finds that it is difficult to complete tasks around the house. She does have a history of some anxiety. She had recently taken Adipex to help with weight loss and that seemed initially to help with her focus. Past Medical History:   Diagnosis Date    Broken foot     right    COVID-19 07/2020       No Known Allergies    Current Outpatient Medications   Medication Sig Dispense Refill    amphetamine-dextroamphetamine (ADDERALL XR) 10 MG extended release capsule Take 1 capsule by mouth every morning for 29 days. 30 capsule 0    etonogestrel-ethinyl estradiol (NUVARING) 0.12-0.015 MG/24HR vaginal ring Insert 1 ring vaginally x3 week, remove for 1 week.  LORazepam (ATIVAN) 0.5 MG tablet Take 0.5 mg by mouth every 8 hours as needed.  drospirenone-ethinyl estradiol 3-0.03 MG TABS Take 1 tablet by mouth daily (Patient not taking: Reported on 6/28/2022)      escitalopram (LEXAPRO) 10 MG tablet Take 10 mg by mouth daily (Patient not taking: Reported on 6/28/2022)      phentermine (ADIPEX-P) 37.5 MG tablet Take 37.5 mg by mouth. (Patient not taking: Reported on 6/28/2022)       No current facility-administered medications for this visit. Social History     Tobacco Use    Smoking status: Never Smoker    Smokeless tobacco: Never Used   Vaping Use    Vaping Use: Never used   Substance Use Topics    Alcohol use: No    Drug use: Never     Review of Systems   Constitutional: Negative for unexpected weight change. Psychiatric/Behavioral: The patient is nervous/anxious.       Blood pressure 118/83, pulse 72, temperature 98.1 °F (36.7 °C), temperature source Temporal, resp. rate 18, height 5' 3\" (1.6 m), weight 196 lb (88.9 kg), last menstrual period 06/28/2022, SpO2 99 %. Objective:   Physical Exam  Vitals reviewed. Constitutional:       General: She is not in acute distress. Appearance: Normal appearance. Cardiovascular:      Rate and Rhythm: Normal rate and regular rhythm. Pulses: Normal pulses. Pulmonary:      Effort: Pulmonary effort is normal.      Breath sounds: Normal breath sounds. Neurological:      General: No focal deficit present. Mental Status: She is alert and oriented to person, place, and time. Assessment / Plan:         Yue Castaneda was seen today for other. Diagnoses and all orders for this visit:    Attention deficit disorder (ADD) without hyperactivity  -     amphetamine-dextroamphetamine (ADDERALL XR) 10 MG extended release capsule; Take 1 capsule by mouth every morning for 29 days. Follow-up and Dispositions    · Return in about 1 month (around 7/28/2022), or if symptoms worsen or fail to improve. Dictated using voice recognition software.  Proofread, but unrecognized errors may exist.

## 2022-07-12 DIAGNOSIS — F98.8 ATTENTION DEFICIT DISORDER (ADD) WITHOUT HYPERACTIVITY: ICD-10-CM

## 2022-07-12 RX ORDER — DEXTROAMPHETAMINE SACCHARATE, AMPHETAMINE ASPARTATE MONOHYDRATE, DEXTROAMPHETAMINE SULFATE AND AMPHETAMINE SULFATE 3.75; 3.75; 3.75; 3.75 MG/1; MG/1; MG/1; MG/1
15 CAPSULE, EXTENDED RELEASE ORAL DAILY
Qty: 30 CAPSULE | Refills: 0 | Status: SHIPPED | OUTPATIENT
Start: 2022-07-12 | End: 2022-07-28 | Stop reason: ALTCHOICE

## 2022-07-12 NOTE — PROGRESS NOTES
the patient not having any improvement in focus on 10 mg on Adderall XR. Will increase to 15 mg. Sent in prescription for:     Requested Prescriptions     Signed Prescriptions Disp Refills    amphetamine-dextroamphetamine (ADDERALL XR) 15 MG extended release capsule 30 capsule 0     Sig: Take 1 capsule by mouth daily for 30 days.

## 2022-07-19 ENCOUNTER — TELEPHONE (OUTPATIENT)
Dept: FAMILY MEDICINE CLINIC | Facility: CLINIC | Age: 32
End: 2022-07-19

## 2022-07-19 DIAGNOSIS — J03.90 TONSILLITIS: Primary | ICD-10-CM

## 2022-07-19 RX ORDER — TRIAMCINOLONE ACETONIDE 40 MG/ML
40 INJECTION, SUSPENSION INTRA-ARTICULAR; INTRAMUSCULAR ONCE
Status: DISCONTINUED | OUTPATIENT
Start: 2022-07-19 | End: 2022-07-28

## 2022-07-19 RX ORDER — CEFUROXIME AXETIL 500 MG/1
500 TABLET ORAL 2 TIMES DAILY
Qty: 20 TABLET | Refills: 0 | Status: SHIPPED | OUTPATIENT
Start: 2022-07-19 | End: 2022-07-28 | Stop reason: ALTCHOICE

## 2022-07-19 NOTE — TELEPHONE ENCOUNTER
Patient thinks she is having another flare of tonsillitis, previously we have treated with Ceftin and also had to give her a shot of Kenalog. We will put in orders for Kenalog injection as well as send in the prescription of Ceftin to the pharmacy. Sent in prescription for:     Requested Prescriptions     Signed Prescriptions Disp Refills    cefUROXime (CEFTIN) 500 MG tablet 20 tablet 0     Sig: Take 1 tablet by mouth in the morning and 1 tablet before bedtime. Do all this for 10 days.      Authorizing Provider: Sadi Taylor

## 2022-07-28 ENCOUNTER — OFFICE VISIT (OUTPATIENT)
Dept: FAMILY MEDICINE CLINIC | Facility: CLINIC | Age: 32
End: 2022-07-28
Payer: COMMERCIAL

## 2022-07-28 VITALS
RESPIRATION RATE: 16 BRPM | DIASTOLIC BLOOD PRESSURE: 78 MMHG | TEMPERATURE: 97.9 F | WEIGHT: 196.6 LBS | HEART RATE: 87 BPM | HEIGHT: 63 IN | BODY MASS INDEX: 34.84 KG/M2 | SYSTOLIC BLOOD PRESSURE: 129 MMHG | OXYGEN SATURATION: 99 %

## 2022-07-28 DIAGNOSIS — F98.8 ATTENTION DEFICIT DISORDER (ADD) WITHOUT HYPERACTIVITY: ICD-10-CM

## 2022-07-28 PROCEDURE — 99212 OFFICE O/P EST SF 10 MIN: CPT | Performed by: FAMILY MEDICINE

## 2022-07-28 RX ORDER — DEXTROAMPHETAMINE SACCHARATE, AMPHETAMINE ASPARTATE MONOHYDRATE, DEXTROAMPHETAMINE SULFATE AND AMPHETAMINE SULFATE 5; 5; 5; 5 MG/1; MG/1; MG/1; MG/1
20 CAPSULE, EXTENDED RELEASE ORAL EVERY MORNING
Qty: 30 CAPSULE | Refills: 0 | Status: SHIPPED | OUTPATIENT
Start: 2022-07-28 | End: 2022-09-16

## 2022-07-28 ASSESSMENT — PATIENT HEALTH QUESTIONNAIRE - PHQ9
SUM OF ALL RESPONSES TO PHQ9 QUESTIONS 1 & 2: 0
1. LITTLE INTEREST OR PLEASURE IN DOING THINGS: 0
SUM OF ALL RESPONSES TO PHQ QUESTIONS 1-9: 0
2. FEELING DOWN, DEPRESSED OR HOPELESS: 0
SUM OF ALL RESPONSES TO PHQ QUESTIONS 1-9: 0

## 2022-07-28 NOTE — PROGRESS NOTES
Subjective:      Patient ID: Marissa Panda is a 28 y.o. female. HPI  Patient comes in today for follow-up on symptoms of ADD. She has noted some improvement with the Adderall extended release 15 mg and feels a little more energy. At times I would still struggle to focus but she does see that it helps more when she takes the medicine versus when she does not such as on the weekends. No side effects noted with it such as difficulty sleeping or palpitations. Has decreased her appetite at times but she still eats to avoid nausea. Past Medical History:   Diagnosis Date    Broken foot     right    COVID-19 07/2020       No Known Allergies    Current Outpatient Medications   Medication Sig Dispense Refill    amphetamine-dextroamphetamine (ADDERALL XR) 20 MG extended release capsule Take 1 capsule by mouth every morning for 30 days. 30 capsule 0    etonogestrel-ethinyl estradiol (NUVARING) 0.12-0.015 MG/24HR vaginal ring Insert 1 ring vaginally x3 week, remove for 1 week. LORazepam (ATIVAN) 0.5 MG tablet Take 0.5 mg by mouth every 8 hours as needed. Current Facility-Administered Medications   Medication Dose Route Frequency Provider Last Rate Last Admin    triamcinolone acetonide (KENALOG-40) injection 40 mg  40 mg IntraMUSCular Once Edward Marroquin MD           Social History     Tobacco Use    Smoking status: Never    Smokeless tobacco: Never   Vaping Use    Vaping Use: Never used   Substance Use Topics    Alcohol use: No    Drug use: Never     Review of Systems   Constitutional:  Negative for unexpected weight change. Cardiovascular:  Negative for palpitations. Psychiatric/Behavioral:  Negative for sleep disturbance. Blood pressure 129/78, pulse 87, temperature 97.9 °F (36.6 °C), temperature source Temporal, resp. rate 16, height 5' 3\" (1.6 m), weight 196 lb 9.6 oz (89.2 kg), last menstrual period 07/25/2022, SpO2 99 %. Objective:   Physical Exam  Vitals reviewed.    Constitutional: General: She is not in acute distress. Appearance: Normal appearance. Cardiovascular:      Rate and Rhythm: Normal rate and regular rhythm. Pulses: Normal pulses. Pulmonary:      Effort: Pulmonary effort is normal.      Breath sounds: Normal breath sounds. Neurological:      General: No focal deficit present. Mental Status: She is alert and oriented to person, place, and time. Assessment / Plan:    Shayan Justin was seen today for add. Diagnoses and all orders for this visit:    Attention deficit disorder (ADD) without hyperactivity  -     amphetamine-dextroamphetamine (ADDERALL XR) 20 MG extended release capsule; Take 1 capsule by mouth every morning for 30 days. We will try a slightly higher dose of the Adderall 20 mg and she will let me know if it does not seem to help. Follow-up and Dispositions    Return in about 3 months (around 10/28/2022), or if symptoms worsen or fail to improve. Dictated using voice recognition software.  Proofread, but unrecognized errors may exist.

## 2022-08-23 RX ORDER — NIRMATRELVIR AND RITONAVIR 300-100 MG
KIT ORAL
Qty: 30 TABLET | Refills: 0 | Status: SHIPPED | OUTPATIENT
Start: 2022-08-23 | End: 2022-08-23 | Stop reason: CLARIF

## 2022-09-07 ENCOUNTER — NURSE ONLY (OUTPATIENT)
Dept: FAMILY MEDICINE CLINIC | Facility: CLINIC | Age: 32
End: 2022-09-07
Payer: COMMERCIAL

## 2022-09-07 DIAGNOSIS — J02.9 ACUTE PHARYNGITIS, UNSPECIFIED ETIOLOGY: Primary | ICD-10-CM

## 2022-09-07 PROCEDURE — 96372 THER/PROPH/DIAG INJ SC/IM: CPT | Performed by: FAMILY MEDICINE

## 2022-09-07 RX ORDER — TRIAMCINOLONE ACETONIDE 40 MG/ML
40 INJECTION, SUSPENSION INTRA-ARTICULAR; INTRAMUSCULAR ONCE
Status: COMPLETED | OUTPATIENT
Start: 2022-09-07 | End: 2022-09-07

## 2022-09-07 RX ADMIN — TRIAMCINOLONE ACETONIDE 40 MG: 40 INJECTION, SUSPENSION INTRA-ARTICULAR; INTRAMUSCULAR at 08:34

## 2022-09-16 DIAGNOSIS — F98.8 ATTENTION DEFICIT DISORDER (ADD) WITHOUT HYPERACTIVITY: Primary | ICD-10-CM

## 2022-09-16 RX ORDER — DEXTROAMPHETAMINE SACCHARATE, AMPHETAMINE ASPARTATE MONOHYDRATE, DEXTROAMPHETAMINE SULFATE AND AMPHETAMINE SULFATE 5; 5; 5; 5 MG/1; MG/1; MG/1; MG/1
20 CAPSULE, EXTENDED RELEASE ORAL DAILY
Qty: 30 CAPSULE | Refills: 0 | Status: SHIPPED | OUTPATIENT
Start: 2022-09-16 | End: 2022-11-03 | Stop reason: ALTCHOICE

## 2022-09-16 RX ORDER — DEXTROAMPHETAMINE SACCHARATE, AMPHETAMINE ASPARTATE MONOHYDRATE, DEXTROAMPHETAMINE SULFATE AND AMPHETAMINE SULFATE 5; 5; 5; 5 MG/1; MG/1; MG/1; MG/1
20 CAPSULE, EXTENDED RELEASE ORAL DAILY
Qty: 30 CAPSULE | Refills: 0 | Status: SHIPPED | OUTPATIENT
Start: 2022-11-15 | End: 2022-11-03 | Stop reason: ALTCHOICE

## 2022-09-16 RX ORDER — DEXTROAMPHETAMINE SACCHARATE, AMPHETAMINE ASPARTATE MONOHYDRATE, DEXTROAMPHETAMINE SULFATE AND AMPHETAMINE SULFATE 5; 5; 5; 5 MG/1; MG/1; MG/1; MG/1
20 CAPSULE, EXTENDED RELEASE ORAL DAILY
Qty: 30 CAPSULE | Refills: 0 | Status: SHIPPED | OUTPATIENT
Start: 2022-10-16 | End: 2022-11-03 | Stop reason: ALTCHOICE

## 2022-10-02 ENCOUNTER — TELEPHONE (OUTPATIENT)
Dept: FAMILY MEDICINE CLINIC | Facility: CLINIC | Age: 32
End: 2022-10-02

## 2022-10-03 ENCOUNTER — TELEPHONE (OUTPATIENT)
Dept: FAMILY MEDICINE CLINIC | Facility: CLINIC | Age: 32
End: 2022-10-03

## 2022-10-03 DIAGNOSIS — U07.1 COVID-19: Primary | ICD-10-CM

## 2022-10-03 RX ORDER — GUAIFENESIN AND CODEINE PHOSPHATE 100; 10 MG/5ML; MG/5ML
5 SOLUTION ORAL EVERY 4 HOURS PRN
Qty: 120 ML | Refills: 0 | Status: SHIPPED | OUTPATIENT
Start: 2022-10-03 | End: 2022-10-07

## 2022-10-03 NOTE — TELEPHONE ENCOUNTER
Patient tested positive for COVID and having some congestion and cough. Sent in prescription for:     Requested Prescriptions     Signed Prescriptions Disp Refills    nirmatrelvir/ritonavir (PAXLOVID) 20 x 150 MG & 10 x 100MG TBPK 30 tablet 0     Sig: Take 3 tablets (two 150 mg nirmatrelvir and one 100 mg ritonavir tablets) by mouth every 12 hours for 5 days.   Normal renal function     Authorizing Provider: Maryanne Almaguer

## 2022-10-03 NOTE — TELEPHONE ENCOUNTER
complains of cough and sore throat with covid. Sent in prescription for:     Requested Prescriptions     Signed Prescriptions Disp Refills    guaiFENesin-codeine (CHERATUSSIN AC) 100-10 MG/5ML syrup 120 mL 0     Sig: Take 5 mLs by mouth every 4 hours as needed for Cough for up to 4 days.      Authorizing Provider: Heraclio Alberto

## 2022-10-04 ENCOUNTER — TELEPHONE (OUTPATIENT)
Dept: FAMILY MEDICINE CLINIC | Facility: CLINIC | Age: 32
End: 2022-10-04

## 2022-10-04 NOTE — TELEPHONE ENCOUNTER
Sent in prescription for:     Requested Prescriptions     Signed Prescriptions Disp Refills    Magic Mouthwash (MIRACLE MOUTHWASH) 120 mL 0     Sig: Swish and spit 5 mLs 4 times daily as needed for Irritation     Authorizing Provider: Dorthea Safe         For throat irritation

## 2022-10-04 NOTE — TELEPHONE ENCOUNTER
Pt called. Pt stating unable to get Rx for Magic Mouthwash. Asking if Rx can be called to [address] will need to be called in with amt of ingredients.

## 2022-10-04 NOTE — TELEPHONE ENCOUNTER
Pt asking if Rx for Magic Mouthwash could be sent to [facility]    [phone].   [facility] didn't have the Medication

## 2022-10-04 NOTE — TELEPHONE ENCOUNTER
Called in a prescription for Magic mouthwash with equal parts of Benadryl, nystatin, lidocaine, Maalox.   They would do a mixture to do 5 mL gargle swish and spit out every 6 hours for 7 days dispense 150 mL no refills

## 2022-10-06 ENCOUNTER — TELEPHONE (OUTPATIENT)
Dept: FAMILY MEDICINE CLINIC | Facility: CLINIC | Age: 32
End: 2022-10-06

## 2022-10-06 DIAGNOSIS — F41.9 ANXIETY: Primary | ICD-10-CM

## 2022-10-06 RX ORDER — LORAZEPAM 0.5 MG/1
0.5 TABLET ORAL EVERY 8 HOURS PRN
Qty: 30 TABLET | Refills: 5 | Status: SHIPPED | OUTPATIENT
Start: 2022-10-06 | End: 2023-04-04

## 2022-10-06 NOTE — TELEPHONE ENCOUNTER
Sent in prescription for:     Requested Prescriptions     Signed Prescriptions Disp Refills    LORazepam (ATIVAN) 0.5 MG tablet 30 tablet 5     Sig: Take 1 tablet by mouth every 8 hours as needed for Anxiety for up to 180 days.      Authorizing Provider: Dexter Bliss

## 2022-10-28 ENCOUNTER — NURSE ONLY (OUTPATIENT)
Dept: FAMILY MEDICINE CLINIC | Facility: CLINIC | Age: 32
End: 2022-10-28
Payer: COMMERCIAL

## 2022-10-28 DIAGNOSIS — R07.9 CHEST PAIN, UNSPECIFIED TYPE: Primary | ICD-10-CM

## 2022-10-28 DIAGNOSIS — I49.1 PAC (PREMATURE ATRIAL CONTRACTION): ICD-10-CM

## 2022-10-28 LAB
ALBUMIN SERPL-MCNC: 3.8 G/DL (ref 3.5–5)
ALBUMIN/GLOB SERPL: 1.1 {RATIO} (ref 0.4–1.6)
ALP SERPL-CCNC: 68 U/L (ref 50–136)
ALT SERPL-CCNC: 36 U/L (ref 12–65)
ANION GAP SERPL CALC-SCNC: 4 MMOL/L (ref 2–11)
AST SERPL-CCNC: 13 U/L (ref 15–37)
BASOPHILS # BLD: 0.1 K/UL (ref 0–0.2)
BASOPHILS NFR BLD: 1 % (ref 0–2)
BILIRUB SERPL-MCNC: 1.1 MG/DL (ref 0.2–1.1)
BUN SERPL-MCNC: 9 MG/DL (ref 6–23)
CALCIUM SERPL-MCNC: 9.6 MG/DL (ref 8.3–10.4)
CHLORIDE SERPL-SCNC: 106 MMOL/L (ref 101–110)
CHOLEST SERPL-MCNC: 169 MG/DL
CO2 SERPL-SCNC: 27 MMOL/L (ref 21–32)
CREAT SERPL-MCNC: 0.8 MG/DL (ref 0.6–1)
DIFFERENTIAL METHOD BLD: NORMAL
EOSINOPHIL # BLD: 0.1 K/UL (ref 0–0.8)
EOSINOPHIL NFR BLD: 1 % (ref 0.5–7.8)
ERYTHROCYTE [DISTWIDTH] IN BLOOD BY AUTOMATED COUNT: 12.7 % (ref 11.9–14.6)
GLOBULIN SER CALC-MCNC: 3.5 G/DL (ref 2.8–4.5)
GLUCOSE SERPL-MCNC: 81 MG/DL (ref 65–100)
HCT VFR BLD AUTO: 45.6 % (ref 35.8–46.3)
HDLC SERPL-MCNC: 64 MG/DL (ref 40–60)
HDLC SERPL: 2.6 {RATIO}
HGB BLD-MCNC: 14.6 G/DL (ref 11.7–15.4)
IMM GRANULOCYTES # BLD AUTO: 0 K/UL (ref 0–0.5)
IMM GRANULOCYTES NFR BLD AUTO: 0 % (ref 0–5)
LDLC SERPL CALC-MCNC: 84.2 MG/DL
LYMPHOCYTES # BLD: 2.9 K/UL (ref 0.5–4.6)
LYMPHOCYTES NFR BLD: 27 % (ref 13–44)
MCH RBC QN AUTO: 30.5 PG (ref 26.1–32.9)
MCHC RBC AUTO-ENTMCNC: 32 G/DL (ref 31.4–35)
MCV RBC AUTO: 95.2 FL (ref 82–102)
MONOCYTES # BLD: 0.8 K/UL (ref 0.1–1.3)
MONOCYTES NFR BLD: 7 % (ref 4–12)
NEUTS SEG # BLD: 6.7 K/UL (ref 1.7–8.2)
NEUTS SEG NFR BLD: 64 % (ref 43–78)
NRBC # BLD: 0 K/UL (ref 0–0.2)
PLATELET # BLD AUTO: 397 K/UL (ref 150–450)
PMV BLD AUTO: 10.4 FL (ref 9.4–12.3)
POTASSIUM SERPL-SCNC: 5.1 MMOL/L (ref 3.5–5.1)
PROT SERPL-MCNC: 7.3 G/DL (ref 6.3–8.2)
RBC # BLD AUTO: 4.79 M/UL (ref 4.05–5.2)
SODIUM SERPL-SCNC: 137 MMOL/L (ref 133–143)
TRIGL SERPL-MCNC: 104 MG/DL (ref 35–150)
TSH, 3RD GENERATION: 0.81 UIU/ML (ref 0.36–3.74)
VLDLC SERPL CALC-MCNC: 20.8 MG/DL (ref 6–23)
WBC # BLD AUTO: 10.5 K/UL (ref 4.3–11.1)

## 2022-10-28 PROCEDURE — 93000 ELECTROCARDIOGRAM COMPLETE: CPT | Performed by: FAMILY MEDICINE

## 2022-10-28 RX ORDER — OMEPRAZOLE 20 MG/1
20 CAPSULE, DELAYED RELEASE ORAL
Qty: 30 CAPSULE | Refills: 5 | Status: SHIPPED | OUTPATIENT
Start: 2022-10-28 | End: 2022-11-03 | Stop reason: ALTCHOICE

## 2022-10-28 ASSESSMENT — ENCOUNTER SYMPTOMS: SHORTNESS OF BREATH: 1

## 2022-10-28 NOTE — PROGRESS NOTES
Subjective:      Patient ID: Samir Toledo is a 33 y.o. female. HPI  Patient comes in today with some chest pain that started last week or so and feels like a heaviness at night. She has been under more stress as she has not been sleeping well and also has a test in the near future. Patient has felt some palpitations but when she recently got the Adderall filled she noticed it was a different type of pill and . She started feeling a little worse after changing that so she last took it yesterday and has not taken it the day. She has had some increased anxiety but also has noticed that the pain seems to be a little worse after eating and has had some increased heartburn recently. The patient did have COVID earlier in October. She has a little bit of shortness of breath at times but her oxygen level has been good. Past Medical History:   Diagnosis Date    Broken foot     right    COVID-19 07/2020       No Known Allergies    Current Outpatient Medications   Medication Sig Dispense Refill    omeprazole (PRILOSEC) 20 MG delayed release capsule Take 1 capsule by mouth every morning (before breakfast) 30 capsule 5    LORazepam (ATIVAN) 0.5 MG tablet Take 1 tablet by mouth every 8 hours as needed for Anxiety for up to 180 days. 30 tablet 5    Magic Mouthwash (MIRACLE MOUTHWASH) Swish and spit 5 mLs 4 times daily as needed for Irritation 120 mL 0    nirmatrelvir/ritonavir (PAXLOVID) 20 x 150 MG & 10 x 100MG TBPK Take 3 tablets (two 150 mg nirmatrelvir and one 100 mg ritonavir tablets) by mouth every 12 hours for 5 days. Normal renal function 30 tablet 0    amphetamine-dextroamphetamine (ADDERALL XR) 20 MG extended release capsule Take 1 capsule by mouth daily for 30 days. 30 capsule 0    amphetamine-dextroamphetamine (ADDERALL XR) 20 MG extended release capsule Take 1 capsule by mouth daily for 30 days.  30 capsule 0    [START ON 11/15/2022] amphetamine-dextroamphetamine (ADDERALL XR) 20 MG extended release capsule Take 1 capsule by mouth daily for 30 days. 30 capsule 0    etonogestrel-ethinyl estradiol (NUVARING) 0.12-0.015 MG/24HR vaginal ring Insert 1 ring vaginally x3 week, remove for 1 week. No current facility-administered medications for this visit. Social History     Tobacco Use    Smoking status: Never    Smokeless tobacco: Never   Vaping Use    Vaping Use: Never used   Substance Use Topics    Alcohol use: No    Drug use: Never     Review of Systems   Constitutional:  Positive for fatigue. Respiratory:  Positive for shortness of breath. Cardiovascular:  Positive for chest pain and palpitations. Negative for leg swelling. Psychiatric/Behavioral:  Positive for sleep disturbance. There were no vitals taken for this visit. There were no vitals taken for this visit. Objective:   Physical Exam  Vitals reviewed. Constitutional:       General: She is not in acute distress. Appearance: Normal appearance. Cardiovascular:      Rate and Rhythm: Normal rate and regular rhythm. Pulses: Normal pulses. Pulmonary:      Effort: Pulmonary effort is normal.      Breath sounds: Normal breath sounds. Neurological:      General: No focal deficit present. Mental Status: She is alert and oriented to person, place, and time. EKG shows normal sinus rhythm, normal axis, no acute changes noted a few PACs noted but a heart rate of 77  Assessment / Plan:       Diagnoses and all orders for this visit:    Chest pain, unspecified type  -     EKG 12 Lead; Future  -     omeprazole (PRILOSEC) 20 MG delayed release capsule; Take 1 capsule by mouth every morning (before breakfast)  -     CBC with Auto Differential; Future  -     Comprehensive Metabolic Panel; Future  -     Lipid Panel; Future    PAC (premature atrial contraction)  -     TSH;  Future       She has a follow-up already scheduled next week but will hold the Adderall, will give her omeprazole in case this is esophageal spasms but going check some fasting labs today including a TSH because of the PACs. Did discuss avoiding the Adderall at this point and even limiting caffeine consumption which she already does. Will notify patient of test results. follow up next week as scheduled. Dictated using voice recognition software.  Proofread, but unrecognized errors may exist.

## 2022-11-03 ENCOUNTER — OFFICE VISIT (OUTPATIENT)
Dept: FAMILY MEDICINE CLINIC | Facility: CLINIC | Age: 32
End: 2022-11-03
Payer: COMMERCIAL

## 2022-11-03 VITALS
TEMPERATURE: 97 F | HEART RATE: 72 BPM | BODY MASS INDEX: 34.73 KG/M2 | DIASTOLIC BLOOD PRESSURE: 71 MMHG | RESPIRATION RATE: 18 BRPM | OXYGEN SATURATION: 98 % | HEIGHT: 63 IN | WEIGHT: 196 LBS | SYSTOLIC BLOOD PRESSURE: 123 MMHG

## 2022-11-03 DIAGNOSIS — F41.9 ANXIETY: Primary | ICD-10-CM

## 2022-11-03 PROCEDURE — 99213 OFFICE O/P EST LOW 20 MIN: CPT | Performed by: FAMILY MEDICINE

## 2022-11-03 RX ORDER — FLUOXETINE HYDROCHLORIDE 20 MG/1
20 CAPSULE ORAL DAILY
Qty: 30 CAPSULE | Refills: 3 | Status: SHIPPED | OUTPATIENT
Start: 2022-11-03

## 2022-11-04 NOTE — PROGRESS NOTES
Subjective:      Patient ID: Leeanne Melendez is a 33 y.o. female. HPI  Patient comes in today for follow-up on her symptoms of anxiety, the symptoms of heartburn and chest discomfort. The heartburn and chest discomfort resolved after starting the omeprazole last week and she is left of the Adderall which seem to be causing some palpitations and worsening symptoms. Still has had some anxiety recently related to some family stressors. She has been trying to watch her diet with portion control and lost some weight. Past Medical History:   Diagnosis Date    Broken foot     right    COVID-19 07/2020       No Known Allergies    Current Outpatient Medications   Medication Sig Dispense Refill    FLUoxetine (PROZAC) 20 MG capsule Take 1 capsule by mouth daily 30 capsule 3    LORazepam (ATIVAN) 0.5 MG tablet Take 1 tablet by mouth every 8 hours as needed for Anxiety for up to 180 days. 30 tablet 5    etonogestrel-ethinyl estradiol (NUVARING) 0.12-0.015 MG/24HR vaginal ring Insert 1 ring vaginally x3 week, remove for 1 week. Magic Mouthwash (MIRACLE MOUTHWASH) Swish and spit 5 mLs 4 times daily as needed for Irritation (Patient not taking: Reported on 11/3/2022) 120 mL 0     No current facility-administered medications for this visit. Social History     Tobacco Use    Smoking status: Never    Smokeless tobacco: Never   Vaping Use    Vaping Use: Never used   Substance Use Topics    Alcohol use: No    Drug use: Never     Review of Systems   Cardiovascular:  Negative for chest pain. Psychiatric/Behavioral:  The patient is nervous/anxious. Blood pressure 123/71, pulse 72, temperature 97 °F (36.1 °C), resp. rate 18, height 5' 3\" (1.6 m), weight 196 lb (88.9 kg), SpO2 98 %. Objective:   Physical Exam  Vitals reviewed. Constitutional:       General: She is not in acute distress. Appearance: Normal appearance. Cardiovascular:      Rate and Rhythm: Normal rate and regular rhythm.       Pulses: Normal pulses. Pulmonary:      Effort: Pulmonary effort is normal.      Breath sounds: Normal breath sounds. Neurological:      General: No focal deficit present. Mental Status: She is alert and oriented to person, place, and time. Assessment / Plan:       Vince Gross was seen today for follow-up. Diagnoses and all orders for this visit:    Anxiety  -     FLUoxetine (PROZAC) 20 MG capsule; Take 1 capsule by mouth daily       Will try Prozac to see if that helps with anxiety. Stop the Adderall for now continue omeprazole for the immediate future. Follow-up and Dispositions    Return in about 3 months (around 2/3/2023), or if symptoms worsen or fail to improve. Dictated using voice recognition software.  Proofread, but unrecognized errors may exist.

## 2023-01-04 ENCOUNTER — TELEPHONE (OUTPATIENT)
Dept: FAMILY MEDICINE CLINIC | Facility: CLINIC | Age: 33
End: 2023-01-04

## 2023-01-04 DIAGNOSIS — K09.9: Primary | ICD-10-CM

## 2023-01-10 ENCOUNTER — TELEPHONE (OUTPATIENT)
Dept: FAMILY MEDICINE CLINIC | Facility: CLINIC | Age: 33
End: 2023-01-10

## 2023-01-10 DIAGNOSIS — F98.8 ATTENTION DEFICIT DISORDER (ADD) WITHOUT HYPERACTIVITY: Primary | ICD-10-CM

## 2023-01-10 NOTE — TELEPHONE ENCOUNTER
Sent in prescription for:     Requested Prescriptions     Signed Prescriptions Disp Refills    lisdexamfetamine (VYVANSE) 30 MG capsule 30 capsule 0     Sig: Take 1 capsule by mouth every morning for 30 days. Max Daily Amount: 30 mg     Authorizing Provider: Edgar Henderson         She had some side effects with the Adderall before when we increased it to 20 mg would like to try something different as she still was having difficulty with focus and staying on task.   She will make a follow-up with me in the near future

## 2023-01-10 NOTE — TELEPHONE ENCOUNTER
Pt is needing PA on Vyvanse. PA submitted to [facility]; states medication is covered under plan. Pharmacy told pt it would be $75 for 30 days and she is unable to afford.  Dennys Castellanos patient assistance paperwork given to pt to apply for assistance

## 2023-01-20 ENCOUNTER — TELEPHONE (OUTPATIENT)
Dept: FAMILY MEDICINE CLINIC | Facility: CLINIC | Age: 33
End: 2023-01-20

## 2023-01-20 RX ORDER — FLUCONAZOLE 150 MG/1
150 TABLET ORAL DAILY
Qty: 3 TABLET | Refills: 0 | Status: SHIPPED | OUTPATIENT
Start: 2023-01-20 | End: 2023-01-23

## 2023-01-20 NOTE — TELEPHONE ENCOUNTER
Sent in prescription for:     Requested Prescriptions     Signed Prescriptions Disp Refills    fluconazole (DIFLUCAN) 150 MG tablet 3 tablet 0     Sig: Take 1 tablet by mouth daily for 3 days     Authorizing Provider: Indy Novoa

## 2023-02-06 ENCOUNTER — TELEPHONE (OUTPATIENT)
Dept: FAMILY MEDICINE CLINIC | Facility: CLINIC | Age: 33
End: 2023-02-06

## 2023-02-06 DIAGNOSIS — R53.83 OTHER FATIGUE: Primary | ICD-10-CM

## 2023-02-06 DIAGNOSIS — E53.8 LOW VITAMIN B12 LEVEL: ICD-10-CM

## 2023-02-06 DIAGNOSIS — R53.83 OTHER FATIGUE: ICD-10-CM

## 2023-02-06 LAB
BASOPHILS # BLD: 0.1 K/UL (ref 0–0.2)
BASOPHILS NFR BLD: 1 % (ref 0–2)
DIFFERENTIAL METHOD BLD: NORMAL
EOSINOPHIL # BLD: 0.1 K/UL (ref 0–0.8)
EOSINOPHIL NFR BLD: 2 % (ref 0.5–7.8)
ERYTHROCYTE [DISTWIDTH] IN BLOOD BY AUTOMATED COUNT: 12.7 % (ref 11.9–14.6)
HCT VFR BLD AUTO: 42.3 % (ref 35.8–46.3)
HGB BLD-MCNC: 13.7 G/DL (ref 11.7–15.4)
IMM GRANULOCYTES # BLD AUTO: 0 K/UL (ref 0–0.5)
IMM GRANULOCYTES NFR BLD AUTO: 0 % (ref 0–5)
LYMPHOCYTES # BLD: 2.5 K/UL (ref 0.5–4.6)
LYMPHOCYTES NFR BLD: 26 % (ref 13–44)
MCH RBC QN AUTO: 30.4 PG (ref 26.1–32.9)
MCHC RBC AUTO-ENTMCNC: 32.4 G/DL (ref 31.4–35)
MCV RBC AUTO: 93.8 FL (ref 82–102)
MONOCYTES # BLD: 0.7 K/UL (ref 0.1–1.3)
MONOCYTES NFR BLD: 8 % (ref 4–12)
NEUTS SEG # BLD: 6.2 K/UL (ref 1.7–8.2)
NEUTS SEG NFR BLD: 63 % (ref 43–78)
NRBC # BLD: 0 K/UL (ref 0–0.2)
PLATELET # BLD AUTO: 394 K/UL (ref 150–450)
PMV BLD AUTO: 10.8 FL (ref 9.4–12.3)
RBC # BLD AUTO: 4.51 M/UL (ref 4.05–5.2)
WBC # BLD AUTO: 9.7 K/UL (ref 4.3–11.1)

## 2023-02-06 NOTE — TELEPHONE ENCOUNTER
Patient has been having more fatigue and has a history of a low B12. We will put in some labs ahead of her appointment later this week.

## 2023-02-07 ENCOUNTER — TELEPHONE (OUTPATIENT)
Dept: FAMILY MEDICINE CLINIC | Facility: CLINIC | Age: 33
End: 2023-02-07

## 2023-02-07 DIAGNOSIS — E53.8 B12 DEFICIENCY: ICD-10-CM

## 2023-02-07 DIAGNOSIS — E53.8 B12 DEFICIENCY: Primary | ICD-10-CM

## 2023-02-07 LAB
ALBUMIN SERPL-MCNC: 3.3 G/DL (ref 3.5–5)
ALBUMIN/GLOB SERPL: 0.8 (ref 0.4–1.6)
ALP SERPL-CCNC: 57 U/L (ref 50–136)
ALT SERPL-CCNC: 31 U/L (ref 12–65)
ANION GAP SERPL CALC-SCNC: 8 MMOL/L (ref 2–11)
AST SERPL-CCNC: 15 U/L (ref 15–37)
BILIRUB SERPL-MCNC: 0.7 MG/DL (ref 0.2–1.1)
BUN SERPL-MCNC: 13 MG/DL (ref 6–23)
CALCIUM SERPL-MCNC: 8.4 MG/DL (ref 8.3–10.4)
CHLORIDE SERPL-SCNC: 110 MMOL/L (ref 101–110)
CO2 SERPL-SCNC: 20 MMOL/L (ref 21–32)
CREAT SERPL-MCNC: 0.8 MG/DL (ref 0.6–1)
GLOBULIN SER CALC-MCNC: 3.9 G/DL (ref 2.8–4.5)
GLUCOSE SERPL-MCNC: 74 MG/DL (ref 65–100)
POTASSIUM SERPL-SCNC: 4.9 MMOL/L (ref 3.5–5.1)
PROT SERPL-MCNC: 7.2 G/DL (ref 6.3–8.2)
SODIUM SERPL-SCNC: 138 MMOL/L (ref 133–143)
TSH, 3RD GENERATION: 0.71 UIU/ML (ref 0.36–3.74)
VIT B12 SERPL-MCNC: 161 PG/ML (ref 193–986)

## 2023-02-07 RX ORDER — CYANOCOBALAMIN 1000 UG/ML
1000 INJECTION, SOLUTION INTRAMUSCULAR; SUBCUTANEOUS
Qty: 1 ML | Refills: 0 | Status: SHIPPED | OUTPATIENT
Start: 2023-02-07 | End: 2023-02-07 | Stop reason: SDUPTHER

## 2023-02-07 RX ORDER — CYANOCOBALAMIN 1000 UG/ML
1000 INJECTION, SOLUTION INTRAMUSCULAR; SUBCUTANEOUS
Qty: 6 ML | Refills: 3 | Status: SHIPPED | OUTPATIENT
Start: 2023-02-07

## 2023-02-07 NOTE — TELEPHONE ENCOUNTER
Sent in prescription for:     Requested Prescriptions     Signed Prescriptions Disp Refills    cyanocobalamin 1000 MCG/ML injection 1 mL 0     Sig: Inject 1 mL into the muscle every 28 days Do 1 cc IM every 14 days for 2 months then go to every 28 days.      Authorizing Provider: Steffi Koyanagi

## 2023-02-07 NOTE — RESULT ENCOUNTER NOTE
Blood sugar was very good at 74, kidney function and liver enzymes are good. B12 was very low at 161 so she does have B12 deficiency and would suggest starting on B12 injections. Thyroid test is normal.  Hemoglobin is good at 13.7.   I will send in the prescription for B12

## 2023-02-10 ENCOUNTER — OFFICE VISIT (OUTPATIENT)
Dept: FAMILY MEDICINE CLINIC | Facility: CLINIC | Age: 33
End: 2023-02-10
Payer: COMMERCIAL

## 2023-02-10 VITALS
BODY MASS INDEX: 34.91 KG/M2 | WEIGHT: 197 LBS | RESPIRATION RATE: 16 BRPM | SYSTOLIC BLOOD PRESSURE: 118 MMHG | OXYGEN SATURATION: 99 % | DIASTOLIC BLOOD PRESSURE: 74 MMHG | HEART RATE: 94 BPM | TEMPERATURE: 96.9 F | HEIGHT: 63 IN

## 2023-02-10 DIAGNOSIS — F98.8 ATTENTION DEFICIT DISORDER (ADD) WITHOUT HYPERACTIVITY: ICD-10-CM

## 2023-02-10 DIAGNOSIS — E53.8 B12 DEFICIENCY: Primary | ICD-10-CM

## 2023-02-10 PROCEDURE — 99213 OFFICE O/P EST LOW 20 MIN: CPT | Performed by: FAMILY MEDICINE

## 2023-02-10 SDOH — ECONOMIC STABILITY: FOOD INSECURITY: WITHIN THE PAST 12 MONTHS, YOU WORRIED THAT YOUR FOOD WOULD RUN OUT BEFORE YOU GOT MONEY TO BUY MORE.: NEVER TRUE

## 2023-02-10 SDOH — ECONOMIC STABILITY: HOUSING INSECURITY
IN THE LAST 12 MONTHS, WAS THERE A TIME WHEN YOU DID NOT HAVE A STEADY PLACE TO SLEEP OR SLEPT IN A SHELTER (INCLUDING NOW)?: NO

## 2023-02-10 SDOH — ECONOMIC STABILITY: FOOD INSECURITY: WITHIN THE PAST 12 MONTHS, THE FOOD YOU BOUGHT JUST DIDN'T LAST AND YOU DIDN'T HAVE MONEY TO GET MORE.: NEVER TRUE

## 2023-02-10 SDOH — ECONOMIC STABILITY: INCOME INSECURITY: HOW HARD IS IT FOR YOU TO PAY FOR THE VERY BASICS LIKE FOOD, HOUSING, MEDICAL CARE, AND HEATING?: NOT HARD AT ALL

## 2023-02-10 ASSESSMENT — PATIENT HEALTH QUESTIONNAIRE - PHQ9
SUM OF ALL RESPONSES TO PHQ QUESTIONS 1-9: 0
1. LITTLE INTEREST OR PLEASURE IN DOING THINGS: 0
SUM OF ALL RESPONSES TO PHQ9 QUESTIONS 1 & 2: 0
2. FEELING DOWN, DEPRESSED OR HOPELESS: 0
SUM OF ALL RESPONSES TO PHQ QUESTIONS 1-9: 0

## 2023-02-10 NOTE — PROGRESS NOTES
Subjective:      Patient ID: Mila Quigley is a 33 y.o. female. HPI  Patient comes in today for follow-up on her symptoms of ADD. The Vyvanse has helped her focus well but she has been extremely tired. In the past she has had a lower B12 level and we rechecked that and found that it was low at 161. She just started on B12 injections this week. No other concerns other than the fatigue. Past Medical History:   Diagnosis Date    Broken foot     right    COVID-19 07/2020       No Known Allergies    Current Outpatient Medications   Medication Sig Dispense Refill    lisdexamfetamine (VYVANSE) 30 MG capsule Take 1 capsule by mouth daily for 30 days. Max Daily Amount: 30 mg 30 capsule 0    [START ON 3/12/2023] lisdexamfetamine (VYVANSE) 30 MG capsule Take 1 capsule by mouth daily for 30 days. Max Daily Amount: 30 mg 30 capsule 0    [START ON 4/11/2023] lisdexamfetamine (VYVANSE) 30 MG capsule Take 1 capsule by mouth daily for 30 days. Max Daily Amount: 30 mg 30 capsule 0    cyanocobalamin 1000 MCG/ML injection Inject 1 mL into the muscle every 14 days Do 1 cc IM every 14 days for 2 months then go to every 28 days. 6 mL 3    LORazepam (ATIVAN) 0.5 MG tablet Take 1 tablet by mouth every 8 hours as needed for Anxiety for up to 180 days. 30 tablet 5    etonogestrel-ethinyl estradiol (NUVARING) 0.12-0.015 MG/24HR vaginal ring Insert 1 ring vaginally x3 week, remove for 1 week. lisdexamfetamine (VYVANSE) 30 MG capsule Take 1 capsule by mouth every morning for 30 days. Max Daily Amount: 30 mg 30 capsule 0    Magic Mouthwash (MIRACLE MOUTHWASH) Swish and spit 5 mLs 4 times daily as needed for Irritation (Patient not taking: No sig reported) 120 mL 0     No current facility-administered medications for this visit.        Social History     Tobacco Use    Smoking status: Never    Smokeless tobacco: Never   Vaping Use    Vaping Use: Never used   Substance Use Topics    Alcohol use: No    Drug use: Never     Review of Systems   Constitutional:  Positive for fatigue. Cardiovascular:  Negative for palpitations. Blood pressure 118/74, pulse 94, temperature 96.9 °F (36.1 °C), temperature source Temporal, resp. rate 16, height 5' 3\" (1.6 m), weight 197 lb (89.4 kg), last menstrual period 01/11/2023, SpO2 99 %. Objective:   Physical Exam  Vitals reviewed. Constitutional:       General: She is not in acute distress. Appearance: Normal appearance. HENT:      Right Ear: Tympanic membrane normal.      Left Ear: Tympanic membrane normal.   Cardiovascular:      Rate and Rhythm: Normal rate and regular rhythm. Pulses: Normal pulses. Pulmonary:      Effort: Pulmonary effort is normal.      Breath sounds: Normal breath sounds. Neurological:      General: No focal deficit present. Mental Status: She is alert and oriented to person, place, and time. Assessment / Plan:    Peyman Tan was seen today for discuss labs. Diagnoses and all orders for this visit:    B12 deficiency  -     CBC with Auto Differential; Future  -     Vitamin B12; Future    Attention deficit disorder (ADD) without hyperactivity  -     lisdexamfetamine (VYVANSE) 30 MG capsule; Take 1 capsule by mouth daily for 30 days. Max Daily Amount: 30 mg  -     lisdexamfetamine (VYVANSE) 30 MG capsule; Take 1 capsule by mouth daily for 30 days. Max Daily Amount: 30 mg  -     lisdexamfetamine (VYVANSE) 30 MG capsule; Take 1 capsule by mouth daily for 30 days. Max Daily Amount: 30 mg    Plan to recheck labs in 3 months  I have reviewed the patients controlled substance prescription history, as maintained in the [address] [place] [address] prescription monitoring program, so that the prescription(s) for a  controlled substance can be given. I did query the [facility] prescribers database and no suspicious activity noted. Follow-up and Dispositions    Return in about 3 months (around 5/10/2023), or if symptoms worsen or fail to improve.          Dictated using voice recognition software.  Proofread, but unrecognized errors may exist.

## 2023-03-30 ENCOUNTER — TELEPHONE (OUTPATIENT)
Dept: FAMILY MEDICINE CLINIC | Facility: CLINIC | Age: 33
End: 2023-03-30

## 2023-03-30 DIAGNOSIS — F98.8 ATTENTION DEFICIT DISORDER (ADD) WITHOUT HYPERACTIVITY: Primary | ICD-10-CM

## 2023-03-30 RX ORDER — DEXTROAMPHETAMINE SACCHARATE, AMPHETAMINE ASPARTATE MONOHYDRATE, DEXTROAMPHETAMINE SULFATE AND AMPHETAMINE SULFATE 5; 5; 5; 5 MG/1; MG/1; MG/1; MG/1
20 CAPSULE, EXTENDED RELEASE ORAL EVERY MORNING
Qty: 30 CAPSULE | Refills: 0 | Status: SHIPPED | OUTPATIENT
Start: 2023-03-30 | End: 2023-03-31

## 2023-03-30 NOTE — TELEPHONE ENCOUNTER
Pt stating cost of Vyvanse is 70.00. Asking if can go back to Adderall or Concerta. Rx to be sent to [facility].

## 2023-03-30 NOTE — TELEPHONE ENCOUNTER
Sent in prescription for:     Requested Prescriptions     Signed Prescriptions Disp Refills    amphetamine-dextroamphetamine (ADDERALL XR) 20 MG extended release capsule 30 capsule 0     Sig: Take 1 capsule by mouth every morning for 30 days.  Max Daily Amount: 20 mg     Authorizing Provider: Damir Fine

## 2023-03-31 RX ORDER — DEXTROAMPHETAMINE SACCHARATE, AMPHETAMINE ASPARTATE MONOHYDRATE, DEXTROAMPHETAMINE SULFATE AND AMPHETAMINE SULFATE 2.5; 2.5; 2.5; 2.5 MG/1; MG/1; MG/1; MG/1
20 CAPSULE, EXTENDED RELEASE ORAL DAILY
Qty: 60 CAPSULE | Refills: 0 | Status: SHIPPED | OUTPATIENT
Start: 2023-03-31 | End: 2023-04-30

## 2023-03-31 NOTE — TELEPHONE ENCOUNTER
Pt requesting Rx for Adderall  to be sent for 20 mg tabs or 10 mg caps 2 x a day.     Rx to be sent To [facility]

## 2023-03-31 NOTE — TELEPHONE ENCOUNTER
Sent in prescription for:     Requested Prescriptions     Signed Prescriptions Disp Refills    amphetamine-dextroamphetamine (ADDERALL XR) 10 MG extended release capsule 60 capsule 0     Sig: Take 2 capsules by mouth daily for 30 days.  Max Daily Amount: 20 mg     Authorizing Provider: Phil Mcclellan

## 2023-05-08 ENCOUNTER — NURSE ONLY (OUTPATIENT)
Dept: FAMILY MEDICINE CLINIC | Facility: CLINIC | Age: 33
End: 2023-05-08

## 2023-05-08 DIAGNOSIS — E53.8 B12 DEFICIENCY: ICD-10-CM

## 2023-05-08 LAB
BASOPHILS # BLD: 0 K/UL (ref 0–0.2)
BASOPHILS NFR BLD: 1 % (ref 0–2)
DIFFERENTIAL METHOD BLD: NORMAL
EOSINOPHIL # BLD: 0.1 K/UL (ref 0–0.8)
EOSINOPHIL NFR BLD: 2 % (ref 0.5–7.8)
ERYTHROCYTE [DISTWIDTH] IN BLOOD BY AUTOMATED COUNT: 12.6 % (ref 11.9–14.6)
HCT VFR BLD AUTO: 41 % (ref 35.8–46.3)
HGB BLD-MCNC: 13.5 G/DL (ref 11.7–15.4)
IMM GRANULOCYTES # BLD AUTO: 0 K/UL (ref 0–0.5)
IMM GRANULOCYTES NFR BLD AUTO: 0 % (ref 0–5)
LYMPHOCYTES # BLD: 3 K/UL (ref 0.5–4.6)
LYMPHOCYTES NFR BLD: 34 % (ref 13–44)
MCH RBC QN AUTO: 30.5 PG (ref 26.1–32.9)
MCHC RBC AUTO-ENTMCNC: 32.9 G/DL (ref 31.4–35)
MCV RBC AUTO: 92.8 FL (ref 82–102)
MONOCYTES # BLD: 0.7 K/UL (ref 0.1–1.3)
MONOCYTES NFR BLD: 8 % (ref 4–12)
NEUTS SEG # BLD: 4.8 K/UL (ref 1.7–8.2)
NEUTS SEG NFR BLD: 55 % (ref 43–78)
NRBC # BLD: 0 K/UL (ref 0–0.2)
PLATELET # BLD AUTO: 394 K/UL (ref 150–450)
PMV BLD AUTO: 10.6 FL (ref 9.4–12.3)
RBC # BLD AUTO: 4.42 M/UL (ref 4.05–5.2)
WBC # BLD AUTO: 8.7 K/UL (ref 4.3–11.1)

## 2023-05-09 LAB — VIT B12 SERPL-MCNC: 727 PG/ML (ref 193–986)

## 2023-05-30 ENCOUNTER — TELEPHONE (OUTPATIENT)
Dept: FAMILY MEDICINE CLINIC | Facility: CLINIC | Age: 33
End: 2023-05-30

## 2023-05-30 NOTE — TELEPHONE ENCOUNTER
The patient scratched her left lower leg and did not think much about it but [place] became a little more red and slightly tender to touch. Sent in prescription for:     Requested Prescriptions     Signed Prescriptions Disp Refills    mupirocin (BACTROBAN) 2 % ointment 22 g 1     Sig: Apply topically 2 times daily.      Authorizing Provider: Apolinar Raymundo

## 2023-06-06 ENCOUNTER — OFFICE VISIT (OUTPATIENT)
Dept: OBGYN CLINIC | Age: 33
End: 2023-06-06
Payer: COMMERCIAL

## 2023-06-06 VITALS
SYSTOLIC BLOOD PRESSURE: 124 MMHG | HEIGHT: 63 IN | BODY MASS INDEX: 36.14 KG/M2 | WEIGHT: 204 LBS | DIASTOLIC BLOOD PRESSURE: 76 MMHG

## 2023-06-06 DIAGNOSIS — Z01.419 WELL WOMAN EXAM WITH ROUTINE GYNECOLOGICAL EXAM: Primary | ICD-10-CM

## 2023-06-06 DIAGNOSIS — Z12.4 PAP SMEAR FOR CERVICAL CANCER SCREENING: ICD-10-CM

## 2023-06-06 DIAGNOSIS — Z11.51 SCREENING FOR HPV (HUMAN PAPILLOMAVIRUS): ICD-10-CM

## 2023-06-06 PROCEDURE — 99395 PREV VISIT EST AGE 18-39: CPT | Performed by: OBSTETRICS & GYNECOLOGY

## 2023-06-06 RX ORDER — ETONOGESTREL AND ETHINYL ESTRADIOL 11.7; 2.7 MG/1; MG/1
INSERT, EXTENDED RELEASE VAGINAL
Qty: 3 EACH | Refills: 4 | Status: SHIPPED | OUTPATIENT
Start: 2023-06-06

## 2023-06-06 NOTE — PROGRESS NOTES
practices    -FU w/ PCP for all non-GYN medical issues and regular screening     -annual Flu vaccine recommended    -healthy eating, exercise       Ena Villavicencio DO

## 2023-06-08 ENCOUNTER — TELEPHONE (OUTPATIENT)
Dept: FAMILY MEDICINE CLINIC | Facility: CLINIC | Age: 33
End: 2023-06-08

## 2023-06-08 DIAGNOSIS — L98.9 SKIN LESION OF CHEST WALL: Primary | ICD-10-CM

## 2023-07-28 ENCOUNTER — NURSE ONLY (OUTPATIENT)
Dept: FAMILY MEDICINE CLINIC | Facility: CLINIC | Age: 33
End: 2023-07-28
Payer: COMMERCIAL

## 2023-07-28 ENCOUNTER — TELEPHONE (OUTPATIENT)
Dept: FAMILY MEDICINE CLINIC | Facility: CLINIC | Age: 33
End: 2023-07-28

## 2023-07-28 DIAGNOSIS — R30.0 DYSURIA: Primary | ICD-10-CM

## 2023-07-28 DIAGNOSIS — R30.0 DYSURIA: ICD-10-CM

## 2023-07-28 LAB
BILIRUBIN, URINE, POC: POSITIVE
BLOOD URINE, POC: ABNORMAL
GLUCOSE URINE, POC: NEGATIVE
KETONES, URINE, POC: NEGATIVE
LEUKOCYTE ESTERASE, URINE, POC: ABNORMAL
NITRITE, URINE, POC: POSITIVE
PH, URINE, POC: 5 (ref 4.6–8)
PROTEIN,URINE, POC: ABNORMAL
SPECIFIC GRAVITY, URINE, POC: 1.03 (ref 1–1.03)
URINALYSIS CLARITY, POC: ABNORMAL
URINALYSIS COLOR, POC: ABNORMAL
UROBILINOGEN, POC: NORMAL

## 2023-07-28 PROCEDURE — 81002 URINALYSIS NONAUTO W/O SCOPE: CPT | Performed by: FAMILY MEDICINE

## 2023-07-28 RX ORDER — CIPROFLOXACIN 500 MG/1
500 TABLET, FILM COATED ORAL 2 TIMES DAILY
Qty: 14 TABLET | Refills: 0 | Status: SHIPPED | OUTPATIENT
Start: 2023-07-28 | End: 2023-08-04

## 2023-07-28 NOTE — TELEPHONE ENCOUNTER
Sent in prescription for:     Requested Prescriptions     Signed Prescriptions Disp Refills    ciprofloxacin (CIPRO) 500 MG tablet 14 tablet 0     Sig: Take 1 tablet by mouth 2 times daily for 7 days     Authorizing Provider: Rhea Rm

## 2023-07-30 LAB
BACTERIA SPEC CULT: ABNORMAL
SERVICE CMNT-IMP: ABNORMAL

## 2023-07-31 NOTE — RESULT ENCOUNTER NOTE
The urine culture was positive but it does look like it is sensitive to the antibiotic that you are on

## 2023-08-03 ENCOUNTER — TELEPHONE (OUTPATIENT)
Dept: FAMILY MEDICINE CLINIC | Facility: CLINIC | Age: 33
End: 2023-08-03

## 2023-08-03 DIAGNOSIS — E53.8 B12 DEFICIENCY: Primary | ICD-10-CM

## 2023-08-15 ENCOUNTER — NURSE ONLY (OUTPATIENT)
Dept: FAMILY MEDICINE CLINIC | Facility: CLINIC | Age: 33
End: 2023-08-15

## 2023-08-15 DIAGNOSIS — E53.8 B12 DEFICIENCY: ICD-10-CM

## 2023-08-15 LAB
BASOPHILS # BLD: 0.1 K/UL (ref 0–0.2)
BASOPHILS NFR BLD: 0 % (ref 0–2)
DIFFERENTIAL METHOD BLD: ABNORMAL
EOSINOPHIL # BLD: 0.2 K/UL (ref 0–0.8)
EOSINOPHIL NFR BLD: 2 % (ref 0.5–7.8)
ERYTHROCYTE [DISTWIDTH] IN BLOOD BY AUTOMATED COUNT: 12.4 % (ref 11.9–14.6)
HCT VFR BLD AUTO: 40 % (ref 35.8–46.3)
HGB BLD-MCNC: 13 G/DL (ref 11.7–15.4)
IMM GRANULOCYTES # BLD AUTO: 0 K/UL (ref 0–0.5)
IMM GRANULOCYTES NFR BLD AUTO: 0 % (ref 0–5)
LYMPHOCYTES # BLD: 3.5 K/UL (ref 0.5–4.6)
LYMPHOCYTES NFR BLD: 29 % (ref 13–44)
MCH RBC QN AUTO: 30.4 PG (ref 26.1–32.9)
MCHC RBC AUTO-ENTMCNC: 32.5 G/DL (ref 31.4–35)
MCV RBC AUTO: 93.7 FL (ref 82–102)
MONOCYTES # BLD: 0.9 K/UL (ref 0.1–1.3)
MONOCYTES NFR BLD: 8 % (ref 4–12)
NEUTS SEG # BLD: 7.4 K/UL (ref 1.7–8.2)
NEUTS SEG NFR BLD: 61 % (ref 43–78)
NRBC # BLD: 0 K/UL (ref 0–0.2)
PLATELET # BLD AUTO: 378 K/UL (ref 150–450)
PMV BLD AUTO: 11.3 FL (ref 9.4–12.3)
RBC # BLD AUTO: 4.27 M/UL (ref 4.05–5.2)
VIT B12 SERPL-MCNC: 760 PG/ML (ref 193–986)
WBC # BLD AUTO: 12.1 K/UL (ref 4.3–11.1)

## 2023-08-25 ENCOUNTER — OFFICE VISIT (OUTPATIENT)
Dept: FAMILY MEDICINE CLINIC | Facility: CLINIC | Age: 33
End: 2023-08-25
Payer: COMMERCIAL

## 2023-08-25 VITALS
OXYGEN SATURATION: 97 % | RESPIRATION RATE: 16 BRPM | HEIGHT: 63 IN | TEMPERATURE: 97.2 F | WEIGHT: 206 LBS | DIASTOLIC BLOOD PRESSURE: 71 MMHG | HEART RATE: 68 BPM | SYSTOLIC BLOOD PRESSURE: 123 MMHG | BODY MASS INDEX: 36.5 KG/M2

## 2023-08-25 DIAGNOSIS — R53.83 OTHER FATIGUE: ICD-10-CM

## 2023-08-25 DIAGNOSIS — F98.8 ATTENTION DEFICIT DISORDER (ADD) WITHOUT HYPERACTIVITY: Primary | ICD-10-CM

## 2023-08-25 DIAGNOSIS — E53.8 B12 DEFICIENCY: ICD-10-CM

## 2023-08-25 DIAGNOSIS — Z00.00 PERIODIC HEALTH ASSESSMENT, GENERAL SCREENING, ADULT: ICD-10-CM

## 2023-08-25 PROCEDURE — 99213 OFFICE O/P EST LOW 20 MIN: CPT | Performed by: FAMILY MEDICINE

## 2023-08-26 DIAGNOSIS — R07.9 CHEST PAIN, UNSPECIFIED TYPE: ICD-10-CM

## 2023-08-27 RX ORDER — OMEPRAZOLE 20 MG/1
CAPSULE, DELAYED RELEASE ORAL
Qty: 30 CAPSULE | Refills: 5 | OUTPATIENT
Start: 2023-08-27

## 2023-08-28 ENCOUNTER — TELEPHONE (OUTPATIENT)
Dept: FAMILY MEDICINE CLINIC | Facility: CLINIC | Age: 33
End: 2023-08-28

## 2023-08-28 NOTE — TELEPHONE ENCOUNTER
PA from [facility] pharmacy for vyvanse. Placed in [place] folder at front.

## 2023-08-29 ENCOUNTER — NURSE ONLY (OUTPATIENT)
Dept: FAMILY MEDICINE CLINIC | Facility: CLINIC | Age: 33
End: 2023-08-29

## 2023-08-29 DIAGNOSIS — Z00.00 PERIODIC HEALTH ASSESSMENT, GENERAL SCREENING, ADULT: ICD-10-CM

## 2023-08-29 DIAGNOSIS — R53.83 OTHER FATIGUE: ICD-10-CM

## 2023-08-29 LAB
ALBUMIN SERPL-MCNC: 3.7 G/DL (ref 3.5–5)
ALBUMIN/GLOB SERPL: 1.2 (ref 0.4–1.6)
ALP SERPL-CCNC: 79 U/L (ref 50–136)
ALT SERPL-CCNC: 37 U/L (ref 12–65)
ANION GAP SERPL CALC-SCNC: 3 MMOL/L (ref 2–11)
AST SERPL-CCNC: 16 U/L (ref 15–37)
BILIRUB SERPL-MCNC: 1 MG/DL (ref 0.2–1.1)
BUN SERPL-MCNC: 11 MG/DL (ref 6–23)
CALCIUM SERPL-MCNC: 8.9 MG/DL (ref 8.3–10.4)
CHLORIDE SERPL-SCNC: 109 MMOL/L (ref 101–110)
CHOLEST SERPL-MCNC: 162 MG/DL
CO2 SERPL-SCNC: 30 MMOL/L (ref 21–32)
CREAT SERPL-MCNC: 0.7 MG/DL (ref 0.6–1)
GLOBULIN SER CALC-MCNC: 3 G/DL (ref 2.8–4.5)
GLUCOSE SERPL-MCNC: 105 MG/DL (ref 65–100)
HDLC SERPL-MCNC: 55 MG/DL (ref 40–60)
HDLC SERPL: 2.9
LDLC SERPL CALC-MCNC: 90.4 MG/DL
POTASSIUM SERPL-SCNC: 4.7 MMOL/L (ref 3.5–5.1)
PROT SERPL-MCNC: 6.7 G/DL (ref 6.3–8.2)
SODIUM SERPL-SCNC: 142 MMOL/L (ref 133–143)
TRIGL SERPL-MCNC: 83 MG/DL (ref 35–150)
TSH, 3RD GENERATION: 0.98 UIU/ML (ref 0.36–3.74)
VLDLC SERPL CALC-MCNC: 16.6 MG/DL (ref 6–23)

## 2023-08-29 NOTE — RESULT ENCOUNTER NOTE
Blood sugar was 105 with normal less than 110 so continue to watch carbohydrate intake, kidney function liver enzymes are good. Thyroid level is normal.  Cholesterol is 162 with a goal less than 200.

## 2023-08-30 LAB
EST. AVERAGE GLUCOSE BLD GHB EST-MCNC: 97 MG/DL
HBA1C MFR BLD: 5 % (ref 4.8–5.6)

## 2023-09-14 ENCOUNTER — TELEPHONE (OUTPATIENT)
Dept: FAMILY MEDICINE CLINIC | Facility: CLINIC | Age: 33
End: 2023-09-14

## 2023-09-14 DIAGNOSIS — L70.9 ACNE, UNSPECIFIED ACNE TYPE: Primary | ICD-10-CM

## 2023-09-14 RX ORDER — CLINDAMYCIN PHOSPHATE 10 MG/G
GEL TOPICAL
Qty: 60 G | Refills: 3 | Status: SHIPPED | OUTPATIENT
Start: 2023-09-14 | End: 2023-09-21

## 2023-09-14 RX ORDER — CLINDAMYCIN PHOSPHATE 10 MG/G
GEL TOPICAL
Qty: 60 G | Refills: 3 | Status: SHIPPED | OUTPATIENT
Start: 2023-09-14 | End: 2023-09-14 | Stop reason: SDUPTHER

## 2023-09-14 NOTE — TELEPHONE ENCOUNTER
Sent in prescription for:     Requested Prescriptions     Signed Prescriptions Disp Refills    clindamycin (CLEOCIN-T) 1 % gel 60 g 3     Sig: Apply topically 2 times daily.      Authorizing Provider: Nicole Leavitt

## 2023-10-23 ENCOUNTER — OFFICE VISIT (OUTPATIENT)
Dept: FAMILY MEDICINE CLINIC | Facility: CLINIC | Age: 33
End: 2023-10-23
Payer: COMMERCIAL

## 2023-10-23 VITALS
HEIGHT: 63 IN | BODY MASS INDEX: 36.68 KG/M2 | TEMPERATURE: 98.6 F | WEIGHT: 207 LBS | HEART RATE: 82 BPM | OXYGEN SATURATION: 99 % | DIASTOLIC BLOOD PRESSURE: 72 MMHG | RESPIRATION RATE: 16 BRPM | SYSTOLIC BLOOD PRESSURE: 129 MMHG

## 2023-10-23 DIAGNOSIS — R00.2 PALPITATIONS: Primary | ICD-10-CM

## 2023-10-23 PROCEDURE — 93000 ELECTROCARDIOGRAM COMPLETE: CPT | Performed by: FAMILY MEDICINE

## 2023-10-23 PROCEDURE — 99213 OFFICE O/P EST LOW 20 MIN: CPT | Performed by: FAMILY MEDICINE

## 2023-10-23 ASSESSMENT — ENCOUNTER SYMPTOMS: SHORTNESS OF BREATH: 0

## 2023-10-24 ENCOUNTER — TELEPHONE (OUTPATIENT)
Age: 33
End: 2023-10-24

## 2023-10-24 ENCOUNTER — TELEPHONE (OUTPATIENT)
Dept: FAMILY MEDICINE CLINIC | Facility: CLINIC | Age: 33
End: 2023-10-24

## 2023-10-24 DIAGNOSIS — R00.2 PALPITATIONS: Primary | ICD-10-CM

## 2023-10-24 NOTE — TELEPHONE ENCOUNTER
----- Message from Bindu Wade DO sent at 10/24/2023  3:31 PM EDT -----  Faustina Russell,     We will get her worked in with me here soon. Radha Simons, can we find her a spot please? I can discuss echo and options with her when I see her. Please have her focus on hydration, avoid caffeine (chocolate as well). Get plenty of rest and minimize stress as well. I would get labs with K, mag and TSH if no recent labs as well, if you can. Thanks and hope you are well. 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 Her EKG showed frequent PACs and she had a similar finding on an EKG last year. At this point I was going to put in a referral to see you hopefully as I was not sure if she may need an echo. We discussed beta-blockers but I am afraid it would make her feel more tired or lower her blood pressure too much.   Thanks as always for your help and advice

## 2023-10-24 NOTE — TELEPHONE ENCOUNTER
Unable to lvm due to vm being full. Pt needs to be scheduled for a new patient appt with Dr. Doe pino.

## 2023-10-24 NOTE — TELEPHONE ENCOUNTER
Cardiology wanted some additional labs and will plan to get her in to be seen.   We will check the TSH at the end of August but I would like to go ahead and get a BMP and magnesium level and I will put those lab orders in if we can go ahead and have them drawn

## 2023-10-27 ENCOUNTER — TELEPHONE (OUTPATIENT)
Dept: FAMILY MEDICINE CLINIC | Facility: CLINIC | Age: 33
End: 2023-10-27

## 2023-10-27 RX ORDER — MELOXICAM 15 MG/1
15 TABLET ORAL DAILY PRN
Qty: 30 TABLET | Refills: 5 | Status: SHIPPED | OUTPATIENT
Start: 2023-10-27

## 2023-10-27 RX ORDER — PANTOPRAZOLE SODIUM 40 MG/1
40 TABLET, DELAYED RELEASE ORAL
Qty: 30 TABLET | Refills: 3 | Status: SHIPPED | OUTPATIENT
Start: 2023-10-27

## 2023-10-27 NOTE — TELEPHONE ENCOUNTER
complains of left shoulder pain and having more reflux and burping.   Sent in prescription for:     Requested Prescriptions     Signed Prescriptions Disp Refills    pantoprazole (PROTONIX) 40 MG tablet 30 tablet 3     Sig: Take 1 tablet by mouth every morning (before breakfast)     Authorizing Provider: Mely Diop    meloxicam (MOBIC) 15 MG tablet 30 tablet 5     Sig: Take 1 tablet by mouth daily as needed for Pain     Authorizing Provider: Mely Diop         She sees Cardiology next week

## 2023-11-02 ENCOUNTER — OFFICE VISIT (OUTPATIENT)
Age: 33
End: 2023-11-02
Payer: COMMERCIAL

## 2023-11-02 VITALS
HEIGHT: 63 IN | DIASTOLIC BLOOD PRESSURE: 84 MMHG | BODY MASS INDEX: 37 KG/M2 | WEIGHT: 208.8 LBS | HEART RATE: 72 BPM | SYSTOLIC BLOOD PRESSURE: 116 MMHG

## 2023-11-02 DIAGNOSIS — I49.3 PVC (PREMATURE VENTRICULAR CONTRACTION): ICD-10-CM

## 2023-11-02 DIAGNOSIS — R94.31 EKG ABNORMALITIES: ICD-10-CM

## 2023-11-02 DIAGNOSIS — R07.2 PRECORDIAL PAIN: ICD-10-CM

## 2023-11-02 DIAGNOSIS — R00.2 PALPITATIONS: ICD-10-CM

## 2023-11-02 LAB
ALBUMIN SERPL-MCNC: 3.8 G/DL (ref 3.5–5)
ALBUMIN/GLOB SERPL: 1.1 (ref 0.4–1.6)
ALP SERPL-CCNC: 75 U/L (ref 50–136)
ALT SERPL-CCNC: 26 U/L (ref 12–65)
ANION GAP SERPL CALC-SCNC: 7 MMOL/L (ref 2–11)
AST SERPL-CCNC: 16 U/L (ref 15–37)
BILIRUB SERPL-MCNC: 0.8 MG/DL (ref 0.2–1.1)
BUN SERPL-MCNC: 9 MG/DL (ref 6–23)
CALCIUM SERPL-MCNC: 9.3 MG/DL (ref 8.3–10.4)
CHLORIDE SERPL-SCNC: 108 MMOL/L (ref 101–110)
CO2 SERPL-SCNC: 25 MMOL/L (ref 21–32)
CREAT SERPL-MCNC: 0.8 MG/DL (ref 0.6–1)
ERYTHROCYTE [DISTWIDTH] IN BLOOD BY AUTOMATED COUNT: 12.6 % (ref 11.9–14.6)
GLOBULIN SER CALC-MCNC: 3.5 G/DL (ref 2.8–4.5)
GLUCOSE SERPL-MCNC: 87 MG/DL (ref 65–100)
HCT VFR BLD AUTO: 41.6 % (ref 35.8–46.3)
HGB BLD-MCNC: 13.6 G/DL (ref 11.7–15.4)
MAGNESIUM SERPL-MCNC: 2 MG/DL (ref 1.8–2.4)
MCH RBC QN AUTO: 30.8 PG (ref 26.1–32.9)
MCHC RBC AUTO-ENTMCNC: 32.7 G/DL (ref 31.4–35)
MCV RBC AUTO: 94.1 FL (ref 82–102)
NRBC # BLD: 0 K/UL (ref 0–0.2)
PLATELET # BLD AUTO: 409 K/UL (ref 150–450)
PMV BLD AUTO: 11.2 FL (ref 9.4–12.3)
POTASSIUM SERPL-SCNC: 4.4 MMOL/L (ref 3.5–5.1)
PROT SERPL-MCNC: 7.3 G/DL (ref 6.3–8.2)
RBC # BLD AUTO: 4.42 M/UL (ref 4.05–5.2)
SODIUM SERPL-SCNC: 140 MMOL/L (ref 133–143)
TSH, 3RD GENERATION: 0.55 UIU/ML (ref 0.36–3.74)
WBC # BLD AUTO: 10.4 K/UL (ref 4.3–11.1)

## 2023-11-02 PROCEDURE — 99244 OFF/OP CNSLTJ NEW/EST MOD 40: CPT | Performed by: INTERNAL MEDICINE

## 2023-11-03 ENCOUNTER — TELEPHONE (OUTPATIENT)
Age: 33
End: 2023-11-03

## 2023-11-03 NOTE — TELEPHONE ENCOUNTER
----- Message from Paco Gagnon DO sent at 11/3/2023  8:31 AM EDT -----  Please call her, labs are great. Continue with plan as discussed yesterday.    Thanks

## 2023-11-08 ENCOUNTER — TELEPHONE (OUTPATIENT)
Age: 33
End: 2023-11-08

## 2023-11-08 NOTE — TELEPHONE ENCOUNTER
----- Message from Solitario Rasmussen DO sent at 11/8/2023  8:48 AM EST -----  Please call the patient. STRESS ECHO was ok, nothing major or concerning. If having more angina (worsening ZARAGOZA, CP, SOB), please let us know. Will review more at follow up and get plan for the future.     ThanksDelilah

## 2023-11-16 ENCOUNTER — OFFICE VISIT (OUTPATIENT)
Age: 33
End: 2023-11-16
Payer: COMMERCIAL

## 2023-11-16 VITALS
HEART RATE: 84 BPM | WEIGHT: 207.4 LBS | SYSTOLIC BLOOD PRESSURE: 110 MMHG | HEIGHT: 63 IN | DIASTOLIC BLOOD PRESSURE: 78 MMHG | BODY MASS INDEX: 36.75 KG/M2

## 2023-11-16 DIAGNOSIS — I49.3 PVC (PREMATURE VENTRICULAR CONTRACTION): Primary | ICD-10-CM

## 2023-11-16 DIAGNOSIS — R94.31 EKG ABNORMALITIES: ICD-10-CM

## 2023-11-16 PROCEDURE — 99213 OFFICE O/P EST LOW 20 MIN: CPT | Performed by: INTERNAL MEDICINE

## 2023-11-16 NOTE — PROGRESS NOTES
[address] [facility], [address]  PHONE: [phone]     23    NAME:  Steve Dowling  : 1990  MRN: [id number]       SUBJECTIVE:   Steve Dowling is a 29 y.o. female seen for a follow up visit regarding the following:     Chief Complaint   Patient presents with    Irregular Heart Beat     strecho       HPI:   CP better on the PPI. SE 2023: The study is negative for echocardiographic evidence of ischemia. This study shows a low prognostic risk of ischemia. Feeling better now, no new palp. CP better now. Patient denies recent history of orthopnea, PND, excessive dizziness and/or syncope. 4yo at home. Off ADHD meds now. Off birth control, claims to not be pregnant. No premature SCD or unexplained syncope in the family. 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 Past Medical History, Past Surgical History, Family history, Social History, and Medications were all reviewed with the patient today and updated as necessary. Current Outpatient Medications   Medication Sig Dispense Refill    meloxicam (MOBIC) 15 MG tablet Take 1 tablet by mouth daily as needed for Pain 30 tablet 5    cyanocobalamin 1000 MCG/ML injection Inject 1 mL into the muscle every 14 days Do 1 cc IM every 14 days for 2 months then go to every 28 days. 6 mL 3    pantoprazole (PROTONIX) 40 MG tablet Take 1 tablet by mouth every morning (before breakfast) (Patient not taking: Reported on 2023) 30 tablet 3    lisdexamfetamine (VYVANSE) 30 MG capsule Take 1 capsule by mouth daily for 30 days. Max Daily Amount: 30 mg 30 capsule 0    lisdexamfetamine (VYVANSE) 30 MG capsule Take 1 capsule by mouth daily for 30 days. Max Daily Amount: 30 mg (Patient not taking: Reported on 2023) 30 capsule 0     No current facility-administered medications for this visit.         No Known Allergies  Patient Active Problem List    Diagnosis Date Noted    Precordial pain 2023    EKG abnormalities

## 2024-01-10 ENCOUNTER — OFFICE VISIT (OUTPATIENT)
Dept: FAMILY MEDICINE CLINIC | Facility: CLINIC | Age: 34
End: 2024-01-10
Payer: COMMERCIAL

## 2024-01-10 VITALS
RESPIRATION RATE: 16 BRPM | OXYGEN SATURATION: 99 % | DIASTOLIC BLOOD PRESSURE: 73 MMHG | TEMPERATURE: 97.2 F | SYSTOLIC BLOOD PRESSURE: 114 MMHG | HEIGHT: 63 IN | BODY MASS INDEX: 35.97 KG/M2 | WEIGHT: 203 LBS | HEART RATE: 101 BPM

## 2024-01-10 DIAGNOSIS — H66.001 NON-RECURRENT ACUTE SUPPURATIVE OTITIS MEDIA OF RIGHT EAR WITHOUT SPONTANEOUS RUPTURE OF TYMPANIC MEMBRANE: ICD-10-CM

## 2024-01-10 DIAGNOSIS — R68.89 FLU-LIKE SYMPTOMS: ICD-10-CM

## 2024-01-10 DIAGNOSIS — J01.00 ACUTE NON-RECURRENT MAXILLARY SINUSITIS: Primary | ICD-10-CM

## 2024-01-10 LAB
INFLUENZA A ANTIGEN, POC: NEGATIVE
INFLUENZA B ANTIGEN, POC: NEGATIVE
VALID INTERNAL CONTROL, POC: YES

## 2024-01-10 PROCEDURE — 99213 OFFICE O/P EST LOW 20 MIN: CPT | Performed by: FAMILY MEDICINE

## 2024-01-10 PROCEDURE — 87804 INFLUENZA ASSAY W/OPTIC: CPT | Performed by: FAMILY MEDICINE

## 2024-01-10 RX ORDER — AMOXICILLIN 875 MG/1
875 TABLET, COATED ORAL 2 TIMES DAILY
Qty: 20 TABLET | Refills: 0 | Status: SHIPPED | OUTPATIENT
Start: 2024-01-10 | End: 2024-01-20

## 2024-01-10 ASSESSMENT — PATIENT HEALTH QUESTIONNAIRE - PHQ9
SUM OF ALL RESPONSES TO PHQ9 QUESTIONS 1 & 2: 0
SUM OF ALL RESPONSES TO PHQ QUESTIONS 1-9: 0
2. FEELING DOWN, DEPRESSED OR HOPELESS: 0
1. LITTLE INTEREST OR PLEASURE IN DOING THINGS: 0
SUM OF ALL RESPONSES TO PHQ QUESTIONS 1-9: 0

## 2024-01-10 NOTE — PROGRESS NOTES
[facility] [place] [place] [address]  Phone: [phone] Fax [phone]  Ellen Bowen MD  1/10/2024         Ms. Healy is a 33 y.o. female patient who comes in for chills, right ear blockage, fullness, and pressure, myalgias, nasal blockage, green nasal discharge, post nasal drip, productive cough, sinus and nasal congestion, bilateral sinus pain, and sore throat .It has been going on for 2 days. It is getting worse. Has tried over the counter medicines without success. Did have COVID last week and had been feeling much better until yesterday afternoon. No fevers in the past 5 days or so. No shortness of breath.    Past Medical History:   Diagnosis Date    Broken foot     right    COVID-19 2020       Past Surgical History:   Procedure Laterality Date     SECTION  2021       Current Outpatient Medications   Medication Sig Dispense Refill    amoxicillin (AMOXIL) 875 MG tablet Take 1 tablet by mouth 2 times daily for 10 days 20 tablet 0    cyanocobalamin 1000 MCG/ML injection Inject 1 mL into the muscle every 14 days Do 1 cc IM every 14 days for 2 months then go to every 28 days. 6 mL 3    lisdexamfetamine (VYVANSE) 30 MG capsule Take 1 capsule by mouth daily for 30 days. Max Daily Amount: 30 mg 30 capsule 0     No current facility-administered medications for this visit.       Family History   Problem Relation Age of Onset    Heart Disease Maternal Grandfather     Elevated Lipids Maternal Grandfather     Diabetes Neg Hx     Breast Cancer Neg Hx     Colon Cancer Neg Hx     Ovarian Cancer Neg Hx     Hypertension Father     No Known Problems Mother     Cancer Father         sarcoma    Thyroid Disease Sister     Heart Disease Maternal Grandmother     Elevated Lipids Maternal Grandmother     Thyroid Disease Father        Social History     Socioeconomic History    Marital status: Single     Spouse name: Not on file    Number of children: Not on file    Years

## 2024-01-16 ENCOUNTER — TELEPHONE (OUTPATIENT)
Dept: FAMILY MEDICINE CLINIC | Facility: CLINIC | Age: 34
End: 2024-01-16

## 2024-01-16 DIAGNOSIS — J01.00 ACUTE NON-RECURRENT MAXILLARY SINUSITIS: Primary | ICD-10-CM

## 2024-01-16 RX ORDER — TRIAMCINOLONE ACETONIDE 40 MG/ML
40 INJECTION, SUSPENSION INTRA-ARTICULAR; INTRAMUSCULAR ONCE
Status: COMPLETED | OUTPATIENT
Start: 2024-01-16 | End: 2024-01-16

## 2024-01-16 RX ADMIN — TRIAMCINOLONE ACETONIDE 40 MG: 40 INJECTION, SUSPENSION INTRA-ARTICULAR; INTRAMUSCULAR at 11:58

## 2024-01-16 NOTE — TELEPHONE ENCOUNTER
Pt c/o nasal congestion and cough.    Presently on Antibiotic.    Asking if Kenalog can be given for drainage.

## 2024-03-11 ENCOUNTER — TELEPHONE (OUTPATIENT)
Dept: FAMILY MEDICINE CLINIC | Facility: CLINIC | Age: 34
End: 2024-03-11

## 2024-03-11 DIAGNOSIS — F98.8 ATTENTION DEFICIT DISORDER (ADD) WITHOUT HYPERACTIVITY: Primary | ICD-10-CM

## 2024-03-11 RX ORDER — DEXTROAMPHETAMINE SACCHARATE, AMPHETAMINE ASPARTATE MONOHYDRATE, DEXTROAMPHETAMINE SULFATE AND AMPHETAMINE SULFATE 5; 5; 5; 5 MG/1; MG/1; MG/1; MG/1
20 CAPSULE, EXTENDED RELEASE ORAL DAILY
Qty: 30 CAPSULE | Refills: 0 | Status: SHIPPED | OUTPATIENT
Start: 2024-05-10 | End: 2024-03-12

## 2024-03-11 RX ORDER — LISDEXAMFETAMINE DIMESYLATE CAPSULES 30 MG/1
30 CAPSULE ORAL DAILY
Qty: 30 CAPSULE | Refills: 0 | Status: SHIPPED | OUTPATIENT
Start: 2024-03-11 | End: 2024-03-11

## 2024-03-11 RX ORDER — DEXTROAMPHETAMINE SACCHARATE, AMPHETAMINE ASPARTATE MONOHYDRATE, DEXTROAMPHETAMINE SULFATE AND AMPHETAMINE SULFATE 5; 5; 5; 5 MG/1; MG/1; MG/1; MG/1
20 CAPSULE, EXTENDED RELEASE ORAL DAILY
Qty: 30 CAPSULE | Refills: 0 | Status: SHIPPED | OUTPATIENT
Start: 2024-03-11 | End: 2024-03-12

## 2024-03-11 RX ORDER — LISDEXAMFETAMINE DIMESYLATE CAPSULES 30 MG/1
30 CAPSULE ORAL DAILY
Qty: 30 CAPSULE | Refills: 0 | Status: SHIPPED | OUTPATIENT
Start: 2024-05-10 | End: 2024-03-11

## 2024-03-11 RX ORDER — LISDEXAMFETAMINE DIMESYLATE CAPSULES 30 MG/1
30 CAPSULE ORAL DAILY
Qty: 30 CAPSULE | Refills: 0 | Status: SHIPPED | OUTPATIENT
Start: 2024-04-10 | End: 2024-03-11

## 2024-03-11 RX ORDER — DEXTROAMPHETAMINE SACCHARATE, AMPHETAMINE ASPARTATE MONOHYDRATE, DEXTROAMPHETAMINE SULFATE AND AMPHETAMINE SULFATE 5; 5; 5; 5 MG/1; MG/1; MG/1; MG/1
20 CAPSULE, EXTENDED RELEASE ORAL DAILY
Qty: 30 CAPSULE | Refills: 0 | Status: SHIPPED | OUTPATIENT
Start: 2024-04-10 | End: 2024-03-12

## 2024-03-11 NOTE — TELEPHONE ENCOUNTER
See if the patient would like to try the Adderall again if she has had side effects with the Adderall I can try to do an appeal letter regarding the Vyvanse

## 2024-03-11 NOTE — TELEPHONE ENCOUNTER
Sent in prescription for:     Requested Prescriptions     Signed Prescriptions Disp Refills    lisdexamfetamine (VYVANSE) 30 MG capsule 30 capsule 0     Sig: Take 1 capsule by mouth daily for 30 days. Max Daily Amount: 30 mg     Authorizing Provider: ITALIA BENSON    lisdexamfetamine (VYVANSE) 30 MG capsule 30 capsule 0     Sig: Take 1 capsule by mouth daily for 30 days. Max Daily Amount: 30 mg     Authorizing Provider: ITALIA BENSON    lisdexamfetamine (VYVANSE) 30 MG capsule 30 capsule 0     Sig: Take 1 capsule by mouth daily for 30 days. Max Daily Amount: 30 mg     Authorizing Provider: ITALIA BENSON

## 2024-03-12 ENCOUNTER — TELEPHONE (OUTPATIENT)
Dept: FAMILY MEDICINE CLINIC | Facility: CLINIC | Age: 34
End: 2024-03-12

## 2024-03-12 DIAGNOSIS — F98.8 ATTENTION DEFICIT DISORDER (ADD) WITHOUT HYPERACTIVITY: ICD-10-CM

## 2024-03-12 DIAGNOSIS — F98.8 ATTENTION DEFICIT DISORDER (ADD) WITHOUT HYPERACTIVITY: Primary | ICD-10-CM

## 2024-03-12 RX ORDER — LISDEXAMFETAMINE DIMESYLATE CAPSULES 30 MG/1
30 CAPSULE ORAL EVERY MORNING
Qty: 30 CAPSULE | Refills: 0 | Status: SHIPPED | OUTPATIENT
Start: 2024-03-12 | End: 2024-03-12 | Stop reason: SDUPTHER

## 2024-03-12 RX ORDER — LISDEXAMFETAMINE DIMESYLATE CAPSULES 30 MG/1
30 CAPSULE ORAL EVERY MORNING
Qty: 30 CAPSULE | Refills: 0 | Status: SHIPPED | OUTPATIENT
Start: 2024-03-12 | End: 2024-04-11

## 2024-03-12 NOTE — TELEPHONE ENCOUNTER
And to do a prior authorization for the Vyvanse, states that the prior authorization is not required for the generic so I may resend that in and indicate to do the generic for that.  Sent in prescription for:     Requested Prescriptions     Signed Prescriptions Disp Refills    lisdexamfetamine (VYVANSE) 30 MG capsule 30 capsule 0     Sig: Take 1 capsule by mouth every morning for 30 days. Please use generic if possible. Max Daily Amount: 30 mg     Authorizing Provider: ITALIA BENSON

## 2024-03-12 NOTE — TELEPHONE ENCOUNTER
Pt called [facility].  Has the brand name of Vyvanse in stock.   [facility] is not available in stock.

## 2024-03-12 NOTE — TELEPHONE ENCOUNTER
Will send to a different pharmacy called [facility], Sent in prescription for:     Requested Prescriptions     Signed Prescriptions Disp Refills    lisdexamfetamine (VYVANSE) 30 MG capsule 30 capsule 0     Sig: Take 1 capsule by mouth every morning for 30 days. Please use generic if possible. Max Daily Amount: 30 mg     Authorizing Provider: ITALIA BENSON

## 2024-03-12 NOTE — TELEPHONE ENCOUNTER
Sent in prescription for:     Requested Prescriptions     Signed Prescriptions Disp Refills    lisdexamfetamine (VYVANSE) 30 MG capsule 30 capsule 0     Sig: Take 1 capsule by mouth every morning for 30 days. Max Daily Amount: 30 mg     Authorizing Provider: ITALIA BENSON         I have reviewed the patient’s controlled substance prescription history, as maintained in the [place] prescription monitoring program, so that the prescription(s) for a  controlled substance can be given.  I did query the [facility] prescribers database and no suspicious activity noted.

## 2024-03-12 NOTE — TELEPHONE ENCOUNTER
Sent in prescription for:     Requested Prescriptions     Signed Prescriptions Disp Refills    lisdexamfetamine (VYVANSE) 30 MG capsule 30 capsule 0     Sig: Take 1 capsule by mouth every morning for 30 days. Max Daily Amount: 30 mg     Authorizing Provider: ITALIA BENSON

## 2024-03-21 ENCOUNTER — OFFICE VISIT (OUTPATIENT)
Dept: FAMILY MEDICINE CLINIC | Facility: CLINIC | Age: 34
End: 2024-03-21
Payer: COMMERCIAL

## 2024-03-21 VITALS
OXYGEN SATURATION: 99 % | BODY MASS INDEX: 35.97 KG/M2 | DIASTOLIC BLOOD PRESSURE: 51 MMHG | WEIGHT: 203 LBS | HEIGHT: 63 IN | SYSTOLIC BLOOD PRESSURE: 117 MMHG | TEMPERATURE: 98.3 F | RESPIRATION RATE: 18 BRPM | HEART RATE: 76 BPM

## 2024-03-21 DIAGNOSIS — E53.8 B12 DEFICIENCY: ICD-10-CM

## 2024-03-21 DIAGNOSIS — F98.8 ATTENTION DEFICIT DISORDER (ADD) WITHOUT HYPERACTIVITY: Primary | ICD-10-CM

## 2024-03-21 PROCEDURE — 99213 OFFICE O/P EST LOW 20 MIN: CPT | Performed by: FAMILY MEDICINE

## 2024-03-21 RX ORDER — CYANOCOBALAMIN 1000 UG/ML
1000 INJECTION, SOLUTION INTRAMUSCULAR; SUBCUTANEOUS
Qty: 6 ML | Refills: 3 | Status: SHIPPED | OUTPATIENT
Start: 2024-03-21

## 2024-03-21 RX ORDER — LISDEXAMFETAMINE DIMESYLATE CAPSULES 30 MG/1
30 CAPSULE ORAL DAILY
Qty: 30 CAPSULE | Refills: 0 | Status: SHIPPED | OUTPATIENT
Start: 2024-05-19 | End: 2024-06-18

## 2024-03-21 RX ORDER — LISDEXAMFETAMINE DIMESYLATE CAPSULES 30 MG/1
30 CAPSULE ORAL DAILY
Qty: 30 CAPSULE | Refills: 0 | Status: SHIPPED | OUTPATIENT
Start: 2024-04-19 | End: 2024-05-19

## 2024-03-21 RX ORDER — LISDEXAMFETAMINE DIMESYLATE CAPSULES 30 MG/1
30 CAPSULE ORAL DAILY
Qty: 30 CAPSULE | Refills: 0 | Status: SHIPPED | OUTPATIENT
Start: 2024-06-18 | End: 2024-07-18

## 2024-03-21 SDOH — ECONOMIC STABILITY: FOOD INSECURITY: WITHIN THE PAST 12 MONTHS, YOU WORRIED THAT YOUR FOOD WOULD RUN OUT BEFORE YOU GOT MONEY TO BUY MORE.: NEVER TRUE

## 2024-03-21 SDOH — ECONOMIC STABILITY: FOOD INSECURITY: WITHIN THE PAST 12 MONTHS, THE FOOD YOU BOUGHT JUST DIDN'T LAST AND YOU DIDN'T HAVE MONEY TO GET MORE.: NEVER TRUE

## 2024-03-21 SDOH — ECONOMIC STABILITY: INCOME INSECURITY: HOW HARD IS IT FOR YOU TO PAY FOR THE VERY BASICS LIKE FOOD, HOUSING, MEDICAL CARE, AND HEATING?: NOT HARD AT ALL

## 2024-03-21 ASSESSMENT — PATIENT HEALTH QUESTIONNAIRE - PHQ9
SUM OF ALL RESPONSES TO PHQ QUESTIONS 1-9: 0
SUM OF ALL RESPONSES TO PHQ9 QUESTIONS 1 & 2: 0
1. LITTLE INTEREST OR PLEASURE IN DOING THINGS: NOT AT ALL
SUM OF ALL RESPONSES TO PHQ QUESTIONS 1-9: 0

## 2024-03-21 NOTE — PROGRESS NOTES
Subjective:      Patient ID: Tana Healy is a 33 y.o. female.    HPI  Patient is here for follow-up on her ADD and B12 deficiency.  Doing well but does find that the Vyvanse 30 mg does start to wear off early afternoon.  If she takes it though much later in the morning she has difficulty sleeping.  She has not had any palpitations with this compared to the Adderall.  Still get distracted and forgetful but she also states her sleep has been disrupted at times.  Continues on the B12 injections every 2 to 3 weeks.  No other new concerns and she did to start on the generic Vyvanse today so we will see how that does compared to the branded version.  Past Medical History:   Diagnosis Date    Broken foot     right    COVID-19 07/2020       No Known Allergies    Current Outpatient Medications   Medication Sig Dispense Refill    cyanocobalamin 1000 MCG/ML injection Inject 1 mL into the muscle every 14 days Do 1 cc IM every 14 days for 2 months then go to every 28 days. 6 mL 3    [START ON 4/19/2024] lisdexamfetamine (VYVANSE) 30 MG capsule Take 1 capsule by mouth daily for 30 days. Max Daily Amount: 30 mg 30 capsule 0    [START ON 5/19/2024] lisdexamfetamine (VYVANSE) 30 MG capsule Take 1 capsule by mouth daily for 30 days. Max Daily Amount: 30 mg 30 capsule 0    [START ON 6/18/2024] lisdexamfetamine (VYVANSE) 30 MG capsule Take 1 capsule by mouth daily for 30 days. Max Daily Amount: 30 mg 30 capsule 0    lisdexamfetamine (VYVANSE) 30 MG capsule Take 1 capsule by mouth every morning for 30 days. Max Daily Amount: 30 mg 30 capsule 0     No current facility-administered medications for this visit.       Social History     Tobacco Use    Smoking status: Never     Passive exposure: Never    Smokeless tobacco: Never   Vaping Use    Vaping Use: Never used   Substance Use Topics    Alcohol use: No    Drug use: Never     Review of Systems   Cardiovascular:  Negative for palpitations.   Psychiatric/Behavioral:  Positive for

## 2024-04-16 ENCOUNTER — OFFICE VISIT (OUTPATIENT)
Dept: FAMILY MEDICINE CLINIC | Facility: CLINIC | Age: 34
End: 2024-04-16

## 2024-04-16 VITALS
RESPIRATION RATE: 16 BRPM | WEIGHT: 203 LBS | TEMPERATURE: 98.3 F | DIASTOLIC BLOOD PRESSURE: 85 MMHG | HEIGHT: 63 IN | HEART RATE: 69 BPM | SYSTOLIC BLOOD PRESSURE: 126 MMHG | OXYGEN SATURATION: 99 % | BODY MASS INDEX: 35.97 KG/M2

## 2024-04-16 DIAGNOSIS — M79.672 PAIN IN BOTH FEET: ICD-10-CM

## 2024-04-16 DIAGNOSIS — R25.2 LEG CRAMP: ICD-10-CM

## 2024-04-16 DIAGNOSIS — M79.671 PAIN IN BOTH FEET: ICD-10-CM

## 2024-04-16 DIAGNOSIS — R20.2 PARESTHESIA: Primary | ICD-10-CM

## 2024-04-16 DIAGNOSIS — E53.8 B12 DEFICIENCY: ICD-10-CM

## 2024-04-16 LAB
ALBUMIN SERPL-MCNC: 4 G/DL (ref 3.5–5)
ALBUMIN/GLOB SERPL: 1.2 (ref 0.4–1.6)
ALP SERPL-CCNC: 79 U/L (ref 50–136)
ALT SERPL-CCNC: 24 U/L (ref 12–65)
ANION GAP SERPL CALC-SCNC: 3 MMOL/L (ref 2–11)
AST SERPL-CCNC: 18 U/L (ref 15–37)
BASOPHILS # BLD: 0.1 K/UL (ref 0–0.2)
BASOPHILS NFR BLD: 1 % (ref 0–2)
BILIRUB SERPL-MCNC: 1.5 MG/DL (ref 0.2–1.1)
BUN SERPL-MCNC: 11 MG/DL (ref 6–23)
CALCIUM SERPL-MCNC: 9.4 MG/DL (ref 8.3–10.4)
CHLORIDE SERPL-SCNC: 106 MMOL/L (ref 103–113)
CO2 SERPL-SCNC: 28 MMOL/L (ref 21–32)
CREAT SERPL-MCNC: 0.8 MG/DL (ref 0.6–1)
DIFFERENTIAL METHOD BLD: NORMAL
EOSINOPHIL # BLD: 0.1 K/UL (ref 0–0.8)
EOSINOPHIL NFR BLD: 1 % (ref 0.5–7.8)
ERYTHROCYTE [DISTWIDTH] IN BLOOD BY AUTOMATED COUNT: 13 % (ref 11.9–14.6)
FERRITIN SERPL-MCNC: 19 NG/ML (ref 8–388)
GLOBULIN SER CALC-MCNC: 3.4 G/DL (ref 2.8–4.5)
GLUCOSE SERPL-MCNC: 87 MG/DL (ref 65–100)
HCT VFR BLD AUTO: 42.2 % (ref 35.8–46.3)
HGB BLD-MCNC: 13.8 G/DL (ref 11.7–15.4)
IMM GRANULOCYTES # BLD AUTO: 0 K/UL (ref 0–0.5)
IMM GRANULOCYTES NFR BLD AUTO: 0 % (ref 0–5)
LYMPHOCYTES # BLD: 3.3 K/UL (ref 0.5–4.6)
LYMPHOCYTES NFR BLD: 30 % (ref 13–44)
MAGNESIUM SERPL-MCNC: 2.3 MG/DL (ref 1.8–2.4)
MCH RBC QN AUTO: 29.9 PG (ref 26.1–32.9)
MCHC RBC AUTO-ENTMCNC: 32.7 G/DL (ref 31.4–35)
MCV RBC AUTO: 91.5 FL (ref 82–102)
MONOCYTES # BLD: 0.9 K/UL (ref 0.1–1.3)
MONOCYTES NFR BLD: 8 % (ref 4–12)
NEUTS SEG # BLD: 6.7 K/UL (ref 1.7–8.2)
NEUTS SEG NFR BLD: 60 % (ref 43–78)
NRBC # BLD: 0 K/UL (ref 0–0.2)
PLATELET # BLD AUTO: 365 K/UL (ref 150–450)
PMV BLD AUTO: 11.1 FL (ref 9.4–12.3)
POTASSIUM SERPL-SCNC: 4.6 MMOL/L (ref 3.5–5.1)
PROT SERPL-MCNC: 7.4 G/DL (ref 6.3–8.2)
RBC # BLD AUTO: 4.61 M/UL (ref 4.05–5.2)
SODIUM SERPL-SCNC: 137 MMOL/L (ref 136–146)
VIT B12 SERPL-MCNC: 861 PG/ML (ref 193–986)
WBC # BLD AUTO: 11 K/UL (ref 4.3–11.1)

## 2024-04-16 ASSESSMENT — PATIENT HEALTH QUESTIONNAIRE - PHQ9
SUM OF ALL RESPONSES TO PHQ QUESTIONS 1-9: 0
9. THOUGHTS THAT YOU WOULD BE BETTER OFF DEAD, OR OF HURTING YOURSELF: NOT AT ALL
SUM OF ALL RESPONSES TO PHQ QUESTIONS 1-9: 0
1. LITTLE INTEREST OR PLEASURE IN DOING THINGS: NOT AT ALL
SUM OF ALL RESPONSES TO PHQ9 QUESTIONS 1 & 2: 0
SUM OF ALL RESPONSES TO PHQ QUESTIONS 1-9: 0
SUM OF ALL RESPONSES TO PHQ QUESTIONS 1-9: 0
2. FEELING DOWN, DEPRESSED OR HOPELESS: NOT AT ALL

## 2024-04-17 NOTE — PROGRESS NOTES
Bleeding subjective:      Patient ID: Tana Healy is a 33 y.o. female.    HPI  Patient is here for discomfort in both of her feet possibly little worse on the left than the right.  This has been going on for about 3 weeks and she realized that she had not taken her B12 injection until last week and had been over a month.  Describes the discomfort as numbness are at times felt like she was walking on a rock.  No weakness in her lower extremities, no back pain.  She did change shoes and thought that may have helped some.  Past Medical History:   Diagnosis Date    Broken foot     right    COVID-19 07/2020       No Known Allergies    Current Outpatient Medications   Medication Sig Dispense Refill    cyanocobalamin 1000 MCG/ML injection Inject 1 mL into the muscle every 14 days Do 1 cc IM every 14 days for 2 months then go to every 28 days. 6 mL 3    [START ON 4/19/2024] lisdexamfetamine (VYVANSE) 30 MG capsule Take 1 capsule by mouth daily for 30 days. Max Daily Amount: 30 mg 30 capsule 0    [START ON 5/19/2024] lisdexamfetamine (VYVANSE) 30 MG capsule Take 1 capsule by mouth daily for 30 days. Max Daily Amount: 30 mg 30 capsule 0    [START ON 6/18/2024] lisdexamfetamine (VYVANSE) 30 MG capsule Take 1 capsule by mouth daily for 30 days. Max Daily Amount: 30 mg 30 capsule 0     No current facility-administered medications for this visit.       Social History     Tobacco Use    Smoking status: Never     Passive exposure: Never    Smokeless tobacco: Never   Vaping Use    Vaping Use: Never used   Substance Use Topics    Alcohol use: No    Drug use: Never     Review of Systems   Cardiovascular:  Negative for palpitations.   Psychiatric/Behavioral:  Positive for decreased concentration.      Blood pressure 126/85, pulse 69, temperature 98.3 °F (36.8 °C), temperature source Temporal, resp. rate 16, height 1.6 m (5' 3\"), weight 92.1 kg (203 lb), last menstrual period 03/30/2024, SpO2 99 %.  Objective:   Physical

## 2024-04-17 NOTE — RESULT ENCOUNTER NOTE
The potassium, kidney function was normal.  Liver enzymes are good.  Magnesium level was normal.  Iron level was low normal at 19.  B12 level was good.  Hemoglobin is good at 13.8.  She could add over-the-counter iron with vitamin C 500 mg 1 daily to help boost iron level and see if that helps but I will go and put in a referral to orthopedics since her other labs look normal.

## 2024-04-23 ENCOUNTER — NURSE ONLY (OUTPATIENT)
Dept: FAMILY MEDICINE CLINIC | Facility: CLINIC | Age: 34
End: 2024-04-23

## 2024-04-23 ENCOUNTER — TELEPHONE (OUTPATIENT)
Dept: FAMILY MEDICINE CLINIC | Facility: CLINIC | Age: 34
End: 2024-04-23
Payer: COMMERCIAL

## 2024-04-23 DIAGNOSIS — N39.0 URINARY TRACT INFECTION WITHOUT HEMATURIA, SITE UNSPECIFIED: ICD-10-CM

## 2024-04-23 DIAGNOSIS — R30.0 DYSURIA: ICD-10-CM

## 2024-04-23 DIAGNOSIS — N39.0 URINARY TRACT INFECTION WITHOUT HEMATURIA, SITE UNSPECIFIED: Primary | ICD-10-CM

## 2024-04-23 LAB
BILIRUBIN, URINE, POC: NEGATIVE
BLOOD URINE, POC: ABNORMAL
GLUCOSE URINE, POC: NEGATIVE
KETONES, URINE, POC: NEGATIVE
LEUKOCYTE ESTERASE, URINE, POC: ABNORMAL
NITRITE, URINE, POC: NEGATIVE
PH, URINE, POC: 6.5 (ref 4.6–8)
PROTEIN,URINE, POC: NEGATIVE
SPECIFIC GRAVITY, URINE, POC: 1 (ref 1–1.03)
URINALYSIS CLARITY, POC: CLEAR
URINALYSIS COLOR, POC: YELLOW
UROBILINOGEN, POC: ABNORMAL

## 2024-04-23 PROCEDURE — 81002 URINALYSIS NONAUTO W/O SCOPE: CPT | Performed by: FAMILY MEDICINE

## 2024-04-23 RX ORDER — CIPROFLOXACIN 500 MG/1
500 TABLET, FILM COATED ORAL 2 TIMES DAILY
Qty: 14 TABLET | Refills: 0 | Status: SHIPPED | OUTPATIENT
Start: 2024-04-23 | End: 2024-04-30

## 2024-04-23 NOTE — TELEPHONE ENCOUNTER
Sent in prescription for:     Requested Prescriptions     Signed Prescriptions Disp Refills    ciprofloxacin (CIPRO) 500 MG tablet 14 tablet 0     Sig: Take 1 tablet by mouth 2 times daily for 7 days     Authorizing Provider: ITALIA BENSON         To Publ,  Put in order for urine culture

## 2024-04-26 LAB
BACTERIA SPEC CULT: ABNORMAL
BACTERIA SPEC CULT: ABNORMAL
SERVICE CMNT-IMP: ABNORMAL

## 2024-05-03 ENCOUNTER — OFFICE VISIT (OUTPATIENT)
Dept: ORTHOPEDIC SURGERY | Age: 34
End: 2024-05-03

## 2024-05-03 VITALS — HEIGHT: 63 IN | BODY MASS INDEX: 35.97 KG/M2 | WEIGHT: 203 LBS

## 2024-05-03 DIAGNOSIS — M79.2 FOOT NEURALGIA: ICD-10-CM

## 2024-05-03 DIAGNOSIS — M79.672 LEFT FOOT PAIN: ICD-10-CM

## 2024-05-03 DIAGNOSIS — M79.671 RIGHT FOOT PAIN: Primary | ICD-10-CM

## 2024-05-03 RX ORDER — GABAPENTIN 100 MG/1
CAPSULE ORAL
Qty: 90 CAPSULE | Refills: 2 | Status: SHIPPED | OUTPATIENT
Start: 2024-05-03 | End: 2024-08-03

## 2024-05-03 NOTE — PROGRESS NOTES
Name: Tana Healy  YOB: 1990  Gender: female  MRN: [id number]    CC: Foot pain    HPI:   05/03/2024: Initial visit: Bilateral foot pain: 1-2-month duration:   Burning tingling numbness worse at night and daily sensation of forefoot fat pad swelling but no obvious swelling     ROS/Meds/PSH/PMH/FH/SH: reviewed today    Tobacco:  reports that she has never smoked. She has never been exposed to tobacco smoke. She has never used smokeless tobacco.     Physical Examination:  Patient appears to be alert and oriented with acceptable appearance.  No obvious distress or SOB  CV: appears to have acceptable vascular color and capillary refill  Neuro: appears to have mostly intact light touch sensation   Skin: Bilateral feet = no soft tissue swelling  MS: Standing: Plantigrade pes planus: Gait full  Bilateral = 1-5 IDN pain with no click  Bilateral = LPN > MPN heel to arch mild pain  Bilateral = no Achilles pain; no plantar fascia pain; no MTP pain  Bilateral = full ankle/foot motion; 5/5 strength    XR: Bilateral: Right: Left: Standing AP lateral mortise ankle, AP oblique foot taken today with no acute pathology; bunion; no metatarsal head AVN noted; no midfoot or hindfoot concerns; no ankle concerns; no plantar enthesopathy  XR Impression:  As above      Reviewed Test/Records/Documents:   04/16/2024: Dr. More: Reflects bilateral foot discomfort more on the left: Discussed prior diagnoses of right broken foot: COVID-19: B12 deficiency: Paresthesias: Leg cramps: Labs ordered.    Injection: No indication    Assessment:    Bilateral foot neuropathic symptoms uncertain etiology    Plan:   The patient and I discussed the above assessment. We explored treatment options.     Concern relates to the etiology of her suspected peripheral neuropathy  She has all the classic symptoms - burning tingling numbness and sensation of fat pad swelling with no documented swelling  She has B12 deficiency and missed several

## 2024-05-03 NOTE — PROGRESS NOTES
The patient was prescribed and fitted with bilateral Airheels, size medium. She also received bilateral ankle sleeves, size S/M.     Patient read and signed documenting they understand and agree to [facility]'s current DME return policy.

## 2024-05-06 ENCOUNTER — TELEPHONE (OUTPATIENT)
Dept: FAMILY MEDICINE CLINIC | Facility: CLINIC | Age: 34
End: 2024-05-06

## 2024-05-06 RX ORDER — MELOXICAM 15 MG/1
15 TABLET ORAL DAILY PRN
Qty: 30 TABLET | Refills: 5 | Status: SHIPPED | OUTPATIENT
Start: 2024-05-06

## 2024-05-06 NOTE — TELEPHONE ENCOUNTER
Patient still having bilateral foot pain with some numbness and tingling at times but also it feels like it is swollen and she is seeing some redness on the sole of her feet suggestive of inflammation.  She did start on the gabapentin as suggested by orthopedics over the weekend for possible neuropathy.  Will also add meloxicam and see if it helps with some of the discomfort.  Referral was placed to neurology, where to referral coordinator to see if she has heard anything about an appointment yet for the patient.  Sent in prescription for:     Requested Prescriptions     Signed Prescriptions Disp Refills    meloxicam (MOBIC) 15 MG tablet 30 tablet 5     Sig: Take 1 tablet by mouth daily as needed for Pain     Authorizing Provider: ITALIA BENSON

## 2024-05-07 ENCOUNTER — TELEPHONE (OUTPATIENT)
Dept: FAMILY MEDICINE CLINIC | Facility: CLINIC | Age: 34
End: 2024-05-07

## 2024-05-07 DIAGNOSIS — E53.8 B12 DEFICIENCY: Primary | ICD-10-CM

## 2024-05-08 ENCOUNTER — PATIENT MESSAGE (OUTPATIENT)
Dept: ORTHOPEDIC SURGERY | Age: 34
End: 2024-05-08

## 2024-05-08 ENCOUNTER — NURSE ONLY (OUTPATIENT)
Dept: FAMILY MEDICINE CLINIC | Facility: CLINIC | Age: 34
End: 2024-05-08

## 2024-05-08 DIAGNOSIS — E53.8 B12 DEFICIENCY: ICD-10-CM

## 2024-05-08 LAB
FOLATE SERPL-MCNC: 10.1 NG/ML (ref 3.1–17.5)
VIT B12 SERPL-MCNC: 754 PG/ML (ref 193–986)

## 2024-05-08 NOTE — RESULT ENCOUNTER NOTE
The B12 and the folic acid levels are normal, continue with the same medication including injections and the liquid B vitamin for now and lets see what the nerve conduction test shows

## 2024-05-08 NOTE — TELEPHONE ENCOUNTER
Called and spoke with pt. Pt's appointment was moved to 06/11/24 @ [address] at [address]. Pt voiced understanding.

## 2024-05-31 ENCOUNTER — PATIENT MESSAGE (OUTPATIENT)
Dept: OBGYN CLINIC | Age: 34
End: 2024-05-31

## 2024-05-31 DIAGNOSIS — O21.9 NAUSEA AND VOMITING DURING PREGNANCY: Primary | ICD-10-CM

## 2024-06-05 RX ORDER — ONDANSETRON 4 MG/1
4 TABLET, ORALLY DISINTEGRATING ORAL 3 TIMES DAILY PRN
Qty: 30 TABLET | Refills: 1 | Status: SHIPPED | OUTPATIENT
Start: 2024-06-05

## 2024-06-13 ENCOUNTER — OFFICE VISIT (OUTPATIENT)
Dept: FAMILY MEDICINE CLINIC | Facility: CLINIC | Age: 34
End: 2024-06-13
Payer: COMMERCIAL

## 2024-06-13 VITALS
SYSTOLIC BLOOD PRESSURE: 113 MMHG | HEIGHT: 63 IN | DIASTOLIC BLOOD PRESSURE: 67 MMHG | BODY MASS INDEX: 36.32 KG/M2 | OXYGEN SATURATION: 97 % | HEART RATE: 93 BPM | TEMPERATURE: 98.4 F | WEIGHT: 205 LBS | RESPIRATION RATE: 16 BRPM

## 2024-06-13 DIAGNOSIS — J02.9 SORE THROAT: ICD-10-CM

## 2024-06-13 DIAGNOSIS — J02.9 ACUTE PHARYNGITIS, UNSPECIFIED ETIOLOGY: ICD-10-CM

## 2024-06-13 DIAGNOSIS — R50.9 FEVER, UNSPECIFIED FEVER CAUSE: Primary | ICD-10-CM

## 2024-06-13 LAB
GROUP A STREP ANTIGEN, POC: NEGATIVE
LOT EXPIRE DATE: ABNORMAL
LOT KIT NUMBER: ABNORMAL
SARS-COV-2, POC: ABNORMAL
VALID INTERNAL CONTROL, POC: YES
VALID INTERNAL CONTROL: ABNORMAL
VENDOR AND KIT NAME POC: ABNORMAL

## 2024-06-13 PROCEDURE — 87426 SARSCOV CORONAVIRUS AG IA: CPT | Performed by: FAMILY MEDICINE

## 2024-06-13 PROCEDURE — 99213 OFFICE O/P EST LOW 20 MIN: CPT | Performed by: FAMILY MEDICINE

## 2024-06-13 PROCEDURE — 87880 STREP A ASSAY W/OPTIC: CPT | Performed by: FAMILY MEDICINE

## 2024-06-13 RX ORDER — AMOXICILLIN 875 MG/1
875 TABLET, COATED ORAL 2 TIMES DAILY
Qty: 20 TABLET | Refills: 0 | Status: SHIPPED | OUTPATIENT
Start: 2024-06-13 | End: 2024-06-23

## 2024-06-13 ASSESSMENT — ENCOUNTER SYMPTOMS
COUGH: 1
SORE THROAT: 1

## 2024-06-13 NOTE — PROGRESS NOTES
HISTORY OF PRESENT ILLNESS     Tana Healy is a 33 y.o. female who presents for       HPI  Patient comes in today with onset of sore throat, fever, myalgias that started last night.  She did have a cough productive of some greenish sputum this morning but her throat has been painful hurting with swallowing.  She has had a history of tonsillitis in the past.    Past Medical History:   Diagnosis Date    Broken foot     right    COVID-19 07/2020       No Known Allergies    Current Outpatient Medications   Medication Sig Dispense Refill    amoxicillin (AMOXIL) 875 MG tablet Take 1 tablet by mouth 2 times daily for 10 days 20 tablet 0    ondansetron (ZOFRAN-ODT) 4 MG disintegrating tablet Take 1 tablet by mouth 3 times daily as needed for Nausea or Vomiting 30 tablet 1    cyanocobalamin 1000 MCG/ML injection Inject 1 mL into the muscle every 14 days Do 1 cc IM every 14 days for 2 months then go to every 28 days. 6 mL 3     No current facility-administered medications for this visit.       Social History     Tobacco Use    Smoking status: Never     Passive exposure: Never    Smokeless tobacco: Never   Vaping Use    Vaping Use: Never used   Substance Use Topics    Alcohol use: No    Drug use: Never       Review of Systems   Constitutional:  Positive for fever.   HENT:  Positive for sore throat.    Respiratory:  Positive for cough.    Musculoskeletal:  Positive for myalgias.       Blood pressure 113/67, pulse 93, temperature 98.4 °F (36.9 °C), temperature source Temporal, resp. rate 16, height 1.6 m (5' 3\"), weight 93 kg (205 lb), SpO2 97 %.    Physical Exam  Vitals reviewed.   Constitutional:       General: She is not in acute distress.     Appearance: Normal appearance.   HENT:      Right Ear: A middle ear effusion is present.      Left Ear: A middle ear effusion is present.      Nose: Congestion and rhinorrhea present.      Mouth/Throat:      Mouth: Mucous membranes are moist.      Pharynx: Uvula midline. Posterior

## 2024-06-17 ENCOUNTER — TELEPHONE (OUTPATIENT)
Dept: FAMILY MEDICINE CLINIC | Facility: CLINIC | Age: 34
End: 2024-06-17

## 2024-06-17 ENCOUNTER — NURSE ONLY (OUTPATIENT)
Dept: FAMILY MEDICINE CLINIC | Facility: CLINIC | Age: 34
End: 2024-06-17
Payer: COMMERCIAL

## 2024-06-17 DIAGNOSIS — J20.9 ACUTE BRONCHITIS, UNSPECIFIED ORGANISM: Primary | ICD-10-CM

## 2024-06-17 PROCEDURE — 96372 THER/PROPH/DIAG INJ SC/IM: CPT | Performed by: FAMILY MEDICINE

## 2024-06-17 RX ORDER — CEFTRIAXONE 1 G/1
1000 INJECTION, POWDER, FOR SOLUTION INTRAMUSCULAR; INTRAVENOUS EVERY 24 HOURS
Status: SHIPPED | OUTPATIENT
Start: 2024-06-17

## 2024-06-17 RX ORDER — CEFUROXIME AXETIL 500 MG/1
500 TABLET ORAL 2 TIMES DAILY
Qty: 20 TABLET | Refills: 0 | Status: SHIPPED | OUTPATIENT
Start: 2024-06-17 | End: 2024-06-27

## 2024-06-17 RX ADMIN — CEFTRIAXONE 1000 MG: 1 INJECTION, POWDER, FOR SOLUTION INTRAMUSCULAR; INTRAVENOUS at 12:36

## 2024-06-17 NOTE — TELEPHONE ENCOUNTER
complains of productive cough with green sputum, fever and myalgias have resolved. Still having some shortness of breath at times and possible wheezing and she is on the fifth day of amoxicillin.  Will going give a shot of Rocephin as well as Ceftin 500 mg twice daily and stop the amoxicillin.  Sent in prescription for:     Requested Prescriptions     Signed Prescriptions Disp Refills    cefUROXime (CEFTIN) 500 MG tablet 20 tablet 0     Sig: Take 1 tablet by mouth 2 times daily for 10 days     Authorizing Provider: ITALIA BENSON         The patient is pregnant so limited on over-the-counter medication for sinus congestion.

## 2024-06-25 ENCOUNTER — PROCEDURE VISIT (OUTPATIENT)
Dept: OBGYN CLINIC | Age: 34
End: 2024-06-25
Payer: COMMERCIAL

## 2024-06-25 ENCOUNTER — OFFICE VISIT (OUTPATIENT)
Dept: OBGYN CLINIC | Age: 34
End: 2024-06-25
Payer: COMMERCIAL

## 2024-06-25 VITALS
SYSTOLIC BLOOD PRESSURE: 124 MMHG | WEIGHT: 203 LBS | HEIGHT: 63 IN | DIASTOLIC BLOOD PRESSURE: 80 MMHG | BODY MASS INDEX: 35.97 KG/M2

## 2024-06-25 DIAGNOSIS — N92.6 MISSED MENSES: Primary | ICD-10-CM

## 2024-06-25 DIAGNOSIS — D25.1 FIBROIDS, INTRAMURAL: ICD-10-CM

## 2024-06-25 DIAGNOSIS — O99.211 OBESITY AFFECTING PREGNANCY IN FIRST TRIMESTER, UNSPECIFIED OBESITY TYPE: ICD-10-CM

## 2024-06-25 DIAGNOSIS — Z98.891 HISTORY OF CESAREAN SECTION: Primary | ICD-10-CM

## 2024-06-25 PROBLEM — J20.9 ACUTE BRONCHITIS: Status: RESOLVED | Noted: 2024-06-17 | Resolved: 2024-06-25

## 2024-06-25 PROBLEM — U07.1 COVID-19: Status: RESOLVED | Noted: 2020-07-16 | Resolved: 2024-06-25

## 2024-06-25 PROCEDURE — 99214 OFFICE O/P EST MOD 30 MIN: CPT | Performed by: OBSTETRICS & GYNECOLOGY

## 2024-06-25 PROCEDURE — 76830 TRANSVAGINAL US NON-OB: CPT | Performed by: OBSTETRICS & GYNECOLOGY

## 2024-06-25 RX ORDER — PNV NO.95/FERROUS FUM/FOLIC AC 28MG-0.8MG
TABLET ORAL
COMMUNITY

## 2024-06-25 NOTE — PROGRESS NOTES
CC:  Missed Period      HPI:  33 y.o.  presents for pregnancy confirmation and establish LOBITO.  No LMP recorded.,  sure, regular cycles.      mild n/v  no vb/cramping      OB hx:    Fam hx any chromosomal or inheritable disorders: no      Her Ob history is as follows:  # 1 - Date: 21, Sex: Male, Weight: 3.59 kg (7 lb 14.6 oz), GA: 38w4d, Delivery: , Low Transverse, Apgar1: 9, Apgar5: 9, Living: Living, Birth Comments: None      Past medical History:   Active Ambulatory Problems     Diagnosis Date Noted    COVID-19 2020    Precordial pain 2023    EKG abnormalities 2023    PVC (premature ventricular contraction) 2023    Acute bronchitis 2024     Resolved Ambulatory Problems     Diagnosis Date Noted    Gestational hypertension, third trimester 2021    Uterine fibroid in pregnancy 10/29/2020    Oligohydramnios in third trimester 2021    Oligohydramnios antepartum 2021    Large for dates 2020    Obesity affecting pregnancy in second trimester 2018    Primigravida in second trimester 2020    Breech presentation 2021     Past Medical History:   Diagnosis Date    Broken foot        Current Outpatient Medications   Medication Sig    cefUROXime (CEFTIN) 500 MG tablet Take 1 tablet by mouth 2 times daily for 10 days    ondansetron (ZOFRAN-ODT) 4 MG disintegrating tablet Take 1 tablet by mouth 3 times daily as needed for Nausea or Vomiting    cyanocobalamin 1000 MCG/ML injection Inject 1 mL into the muscle every 14 days Do 1 cc IM every 14 days for 2 months then go to every 28 days.     Current Facility-Administered Medications   Medication Dose Route Frequency    cefTRIAXone (ROCEPHIN) injection 1,000 mg  1,000 mg IntraMUSCular Q24H       Past Surgical:  has a past surgical history that includes  section (2021).    No Known Allergies      Current Outpatient Medications on File Prior to Visit   Medication Sig Dispense Refill

## 2024-07-08 PROBLEM — Z87.59 HISTORY OF GESTATIONAL HYPERTENSION: Status: ACTIVE | Noted: 2024-07-08

## 2024-07-08 PROBLEM — Z87.59 HISTORY OF OLIGOHYDRAMNIOS: Status: ACTIVE | Noted: 2024-07-08

## 2024-07-08 PROBLEM — R94.31 EKG ABNORMALITIES: Status: RESOLVED | Noted: 2023-11-02 | Resolved: 2024-07-08

## 2024-07-08 PROBLEM — R07.2 PRECORDIAL PAIN: Status: RESOLVED | Noted: 2023-11-02 | Resolved: 2024-07-08

## 2024-07-08 NOTE — PROGRESS NOTES
NOB consult with pt. Labs today: Prenatal panel, HgA1c, PIH, NIPT. Offered pt the option of CF, SMA, and Fragile X carrier testing. Pt declined testing. Patient declined in previous pregnancy. Offered pt the option of genetic screening (1st screen vs Tetra vs NIPT). Pt desires NIPT. Instructed pt on exercise/nutrition in pregnancy. Reviewed [facility] preg book. Advised pt on using [facility] for hospital needs and [facility] L&D for pregnancy related emergencies. IMPACTT Program discussed with patient and sheet given to her during visit today. Encouraged her to inform us should she start having increase in depression/anxiety. Pt states understanding. NOB forms signed, scanned, and given to pt.     Vitals:  Weight: 204 lb  BMI: 36  BP: 112/70    Medical Hx: History of PVCs () was having chest pressure that caused her to go to the doctor, was taking Adderall at the time, cardiology did stress test to diagnose it. - Uterine fibroids () noted on initial ultrasound. - Gestational hypertension () states that it was only during delivery. - Oligohydramnios ().    Surgical Hx:  section () breech.     Last Pap: 2023 WNL.     Pt OB c/o: Patient has been dealing with nausea, no vomiting. Discussed nausea reduction techniques.      Fam hx any chromosomal or inheritable disorders: None.    COVID Vaccine: x0 per patient  Flu Vaccine: Discussed flu recommendations with patient. Patient previously immunized with flu vaccine.    OB hx: G1: 2021, 38w4d, CS-Ltranv, Male, 3.9 kg (7 lb 14 oz), Living. Comments: C section for breech presentation, Gestational hypertension, Oligohydramnios.    G2: 2024, Current.     NV: Next appointment scheduled on  with Tennille Johnson NP. Patient of Dr Villavicencio.

## 2024-07-11 ENCOUNTER — NURSE ONLY (OUTPATIENT)
Dept: OBGYN CLINIC | Age: 34
End: 2024-07-11

## 2024-07-11 VITALS
WEIGHT: 204 LBS | DIASTOLIC BLOOD PRESSURE: 70 MMHG | BODY MASS INDEX: 36.14 KG/M2 | HEIGHT: 63 IN | SYSTOLIC BLOOD PRESSURE: 112 MMHG

## 2024-07-11 DIAGNOSIS — Z3A.12 12 WEEKS GESTATION OF PREGNANCY: Primary | ICD-10-CM

## 2024-07-11 DIAGNOSIS — Z34.92 NORMAL PREGNANCY, SECOND TRIMESTER: ICD-10-CM

## 2024-07-11 DIAGNOSIS — Z87.59 HISTORY OF OLIGOHYDRAMNIOS: ICD-10-CM

## 2024-07-11 DIAGNOSIS — Z87.59 HISTORY OF GESTATIONAL HYPERTENSION: ICD-10-CM

## 2024-07-11 LAB
ABO + RH BLD: NORMAL
ALBUMIN SERPL-MCNC: 3.3 G/DL (ref 3.5–5)
ALBUMIN/GLOB SERPL: 1 (ref 1–1.9)
ALP SERPL-CCNC: 60 U/L (ref 35–104)
ALT SERPL-CCNC: 17 U/L (ref 12–65)
ANION GAP SERPL CALC-SCNC: 11 MMOL/L (ref 9–18)
AST SERPL-CCNC: 18 U/L (ref 15–37)
BASOPHILS # BLD: 0 K/UL (ref 0–0.2)
BASOPHILS NFR BLD: 0 % (ref 0–2)
BILIRUB SERPL-MCNC: 0.7 MG/DL (ref 0–1.2)
BLOOD GROUP ANTIBODIES SERPL: NORMAL
BUN SERPL-MCNC: 6 MG/DL (ref 6–23)
CALCIUM SERPL-MCNC: 9.1 MG/DL (ref 8.8–10.2)
CHLORIDE SERPL-SCNC: 102 MMOL/L (ref 98–107)
CO2 SERPL-SCNC: 23 MMOL/L (ref 20–28)
CREAT SERPL-MCNC: 0.65 MG/DL (ref 0.6–1.1)
CREAT UR-MCNC: 176 MG/DL (ref 28–217)
DIFFERENTIAL METHOD BLD: NORMAL
EOSINOPHIL # BLD: 0.1 K/UL (ref 0–0.8)
EOSINOPHIL NFR BLD: 1 % (ref 0.5–7.8)
ERYTHROCYTE [DISTWIDTH] IN BLOOD BY AUTOMATED COUNT: 13 % (ref 11.9–14.6)
EST. AVERAGE GLUCOSE BLD GHB EST-MCNC: 99 MG/DL
GLOBULIN SER CALC-MCNC: 3.3 G/DL (ref 2.3–3.5)
GLUCOSE SERPL-MCNC: 91 MG/DL (ref 70–99)
HBA1C MFR BLD: 5.1 % (ref 0–5.6)
HBV SURFACE AG SER QL: NONREACTIVE
HCT VFR BLD AUTO: 40.9 % (ref 35.8–46.3)
HCV AB SER QL: NONREACTIVE
HGB BLD-MCNC: 13.4 G/DL (ref 11.7–15.4)
HIV 1+2 AB+HIV1 P24 AG SERPL QL IA: NONREACTIVE
HIV 1/2 RESULT COMMENT: NORMAL
IMM GRANULOCYTES # BLD AUTO: 0 K/UL (ref 0–0.5)
IMM GRANULOCYTES NFR BLD AUTO: 0 % (ref 0–5)
LDH SERPL L TO P-CCNC: 199 U/L (ref 127–281)
LYMPHOCYTES # BLD: 2.6 K/UL (ref 0.5–4.6)
LYMPHOCYTES NFR BLD: 26 % (ref 13–44)
MCH RBC QN AUTO: 31.3 PG (ref 26.1–32.9)
MCHC RBC AUTO-ENTMCNC: 32.8 G/DL (ref 31.4–35)
MCV RBC AUTO: 95.6 FL (ref 82–102)
MONOCYTES # BLD: 0.7 K/UL (ref 0.1–1.3)
MONOCYTES NFR BLD: 7 % (ref 4–12)
NEUTS SEG # BLD: 6.6 K/UL (ref 1.7–8.2)
NEUTS SEG NFR BLD: 66 % (ref 43–78)
NRBC # BLD: 0 K/UL (ref 0–0.2)
PLATELET # BLD AUTO: 380 K/UL (ref 150–450)
PMV BLD AUTO: 10.9 FL (ref 9.4–12.3)
POTASSIUM SERPL-SCNC: 4.5 MMOL/L (ref 3.5–5.1)
PROT SERPL-MCNC: 6.6 G/DL (ref 6.3–8.2)
PROT UR-MCNC: 10 MG/DL
PROT/CREAT UR-RTO: 0.1
RBC # BLD AUTO: 4.28 M/UL (ref 4.05–5.2)
RUBV IGG SERPL IA-ACNC: 470 IU/ML
SODIUM SERPL-SCNC: 136 MMOL/L (ref 136–145)
T PALLIDUM AB SER QL IA: NONREACTIVE
URATE SERPL-MCNC: 4.1 MG/DL (ref 2.5–7.1)
WBC # BLD AUTO: 10 K/UL (ref 4.3–11.1)

## 2024-07-14 LAB
BACTERIA SPEC CULT: NORMAL
BACTERIA SPEC CULT: NORMAL
SERVICE CMNT-IMP: NORMAL

## 2024-07-29 ENCOUNTER — ROUTINE PRENATAL (OUTPATIENT)
Dept: OBGYN CLINIC | Age: 34
End: 2024-07-29

## 2024-07-29 VITALS — DIASTOLIC BLOOD PRESSURE: 70 MMHG | WEIGHT: 201 LBS | BODY MASS INDEX: 35.61 KG/M2 | SYSTOLIC BLOOD PRESSURE: 122 MMHG

## 2024-07-29 DIAGNOSIS — Z3A.14 14 WEEKS GESTATION OF PREGNANCY: ICD-10-CM

## 2024-07-29 DIAGNOSIS — O99.212 OBESITY AFFECTING PREGNANCY IN SECOND TRIMESTER, UNSPECIFIED OBESITY TYPE: ICD-10-CM

## 2024-07-29 DIAGNOSIS — I49.3 PVC (PREMATURE VENTRICULAR CONTRACTION): ICD-10-CM

## 2024-07-29 DIAGNOSIS — Z87.59 HISTORY OF OLIGOHYDRAMNIOS: ICD-10-CM

## 2024-07-29 DIAGNOSIS — Z98.891 HISTORY OF CESAREAN SECTION: ICD-10-CM

## 2024-07-29 DIAGNOSIS — Z87.59 HISTORY OF GESTATIONAL HYPERTENSION: ICD-10-CM

## 2024-07-29 DIAGNOSIS — D25.1 FIBROIDS, INTRAMURAL: ICD-10-CM

## 2024-07-29 DIAGNOSIS — Z34.82 PRENATAL CARE, SUBSEQUENT PREGNANCY, SECOND TRIMESTER: Primary | ICD-10-CM

## 2024-07-29 DIAGNOSIS — Z11.3 SCREENING EXAMINATION FOR STD (SEXUALLY TRANSMITTED DISEASE): ICD-10-CM

## 2024-07-29 PROCEDURE — 0500F INITIAL PRENATAL CARE VISIT: CPT | Performed by: NURSE PRACTITIONER

## 2024-07-29 NOTE — PATIENT INSTRUCTIONS
PTL/labor precautions, FMC, and pregnancy warning signs reviewed. Pt advised to call the office at [phone] or go straight to Labor and Delivery at [facility] with any of the following concerns vaginal bleeding, leaking of fluid, christophe regularly Q 5-7 minutes for over an hour.

## 2024-08-01 LAB
C TRACH RRNA SPEC QL NAA+PROBE: NEGATIVE
N GONORRHOEA RRNA SPEC QL NAA+PROBE: NEGATIVE
SPECIMEN SOURCE: NORMAL

## 2024-09-03 ENCOUNTER — ROUTINE PRENATAL (OUTPATIENT)
Dept: OBGYN CLINIC | Age: 34
End: 2024-09-03

## 2024-09-03 VITALS — BODY MASS INDEX: 36.85 KG/M2 | SYSTOLIC BLOOD PRESSURE: 120 MMHG | DIASTOLIC BLOOD PRESSURE: 72 MMHG | WEIGHT: 208 LBS

## 2024-09-03 DIAGNOSIS — O09.92 SUPERVISION OF HIGH RISK PREGNANCY IN SECOND TRIMESTER: ICD-10-CM

## 2024-09-03 DIAGNOSIS — Z87.59 HISTORY OF GESTATIONAL HYPERTENSION: Primary | ICD-10-CM

## 2024-09-03 PROCEDURE — 0502F SUBSEQUENT PRENATAL CARE: CPT | Performed by: OBSTETRICS & GYNECOLOGY

## 2024-09-03 NOTE — PROGRESS NOTES
LOAN. .  20w0d   Denies LOF, VB, Ctxs.  Good FM.      Vitals:    24 1352   BP: 120/72        General: well appearing NAD  Psych: normal affect  Abdomen: gravid   LE: no edema    Supervision of high risk pregnancy in second trimester  Low risk NIPT   ASA  Anatomy scan scheduled with MFM next week (due to BMI)      Ena Villavicencio, DO

## 2024-09-05 ENCOUNTER — CARE COORDINATION (OUTPATIENT)
Dept: OTHER | Facility: CLINIC | Age: 34
End: 2024-09-05

## 2024-09-05 ENCOUNTER — PATIENT MESSAGE (OUTPATIENT)
Dept: OBGYN CLINIC | Age: 34
End: 2024-09-05

## 2024-09-05 NOTE — CARE COORDINATION
Patient eligible for [facility] Maternity Management Program    Maternity Care Manager contacted the patient by telephone to discuss the maternity management program.  Patient agrees to care management services at this time. Verified name and  with patient as identifiers.   Patient Current Location: Home: [facility] [address]   [place] [address]      Call within 2 business days of discharge: No     Last Discharge Facility       None            Risk Factors Identified:  Hx: GHTN, Oligohydramnios and c/section      Needs to be reviewed by the provider   none         Method of communication with provider : none    Advance Care Planning:   Does patient have an Advance Directive:  reviewed and current.     Does patient have OB/Gyn Selected? Yes    Discussed follow up appointments. If no appointment was previously scheduled, appointment scheduling offered: Yes  [facility] follow up appointment(s):   Future Appointments   Date Time Provider Department Center   2024  8:00 AM [facility] ULTRASOUND 2 [facility] GVL AMB   2024  9:00 AM Devika Catalan MD [facility] GVL AMB   2024  8:15 AM Keith More MD [facility]   2024  8:45 AM Tennille Johnson, APRN - NP [facility] 1 GVL AMB     Non-[facility] follow up appointment(s): n/a    OB History:   OB History    Para Term  AB Living   2 1 1 0 0 1   SAB IAB Ectopic Molar Multiple Live Births             1      # Outcome Date GA Lbr Joseluis/2nd Weight Sex Type Anes PTL Lv   2 Current            1 Term 21 38w4d  3.59 kg (7 lb 14.6 oz) M CS-LTranv Spinal N JAMES      Complications: Breech presentation       20w2d    Medication reconciliation was performed with patient, who verbalizes understanding of administration of home medications. Advised obtaining a 90-day supply of all daily and as-needed medications.     Barriers/Support system:  patient and spouse  Return to work planning? Not yet, too early      2nd and 3rd Trimester Focused

## 2024-09-09 ENCOUNTER — ROUTINE PRENATAL (OUTPATIENT)
Dept: OBGYN CLINIC | Age: 34
End: 2024-09-09
Payer: COMMERCIAL

## 2024-09-09 VITALS — DIASTOLIC BLOOD PRESSURE: 70 MMHG | SYSTOLIC BLOOD PRESSURE: 112 MMHG

## 2024-09-09 DIAGNOSIS — D25.9 UTERINE FIBROID DURING PREGNANCY, ANTEPARTUM: ICD-10-CM

## 2024-09-09 DIAGNOSIS — Z87.59 HISTORY OF GESTATIONAL HYPERTENSION: ICD-10-CM

## 2024-09-09 DIAGNOSIS — O34.10 UTERINE FIBROID DURING PREGNANCY, ANTEPARTUM: ICD-10-CM

## 2024-09-09 DIAGNOSIS — E53.8 VITAMIN B12 DEFICIENCY: ICD-10-CM

## 2024-09-09 DIAGNOSIS — O09.92 HIGH-RISK PREGNANCY IN SECOND TRIMESTER: Primary | ICD-10-CM

## 2024-09-09 DIAGNOSIS — Z98.891 HISTORY OF CESAREAN SECTION: ICD-10-CM

## 2024-09-09 DIAGNOSIS — O99.212 OBESITY AFFECTING PREGNANCY IN SECOND TRIMESTER, UNSPECIFIED OBESITY TYPE: ICD-10-CM

## 2024-09-09 DIAGNOSIS — Z3A.20 20 WEEKS GESTATION OF PREGNANCY: ICD-10-CM

## 2024-09-09 DIAGNOSIS — Z87.59 HISTORY OF OLIGOHYDRAMNIOS: ICD-10-CM

## 2024-09-09 DIAGNOSIS — I49.3 PVC (PREMATURE VENTRICULAR CONTRACTION): ICD-10-CM

## 2024-09-09 PROCEDURE — 99205 OFFICE O/P NEW HI 60 MIN: CPT | Performed by: OBSTETRICS & GYNECOLOGY

## 2024-09-09 PROCEDURE — 76827 ECHO EXAM OF FETAL HEART: CPT | Performed by: OBSTETRICS & GYNECOLOGY

## 2024-09-09 PROCEDURE — 76825 ECHO EXAM OF FETAL HEART: CPT | Performed by: OBSTETRICS & GYNECOLOGY

## 2024-09-09 PROCEDURE — 76811 OB US DETAILED SNGL FETUS: CPT | Performed by: OBSTETRICS & GYNECOLOGY

## 2024-09-09 PROCEDURE — 93325 DOPPLER ECHO COLOR FLOW MAPG: CPT | Performed by: OBSTETRICS & GYNECOLOGY

## 2024-09-09 ASSESSMENT — PATIENT HEALTH QUESTIONNAIRE - PHQ9
1. LITTLE INTEREST OR PLEASURE IN DOING THINGS: NOT AT ALL
SUM OF ALL RESPONSES TO PHQ QUESTIONS 1-9: 0
SUM OF ALL RESPONSES TO PHQ9 QUESTIONS 1 & 2: 0
SUM OF ALL RESPONSES TO PHQ QUESTIONS 1-9: 0
SUM OF ALL RESPONSES TO PHQ QUESTIONS 1-9: 0
2. FEELING DOWN, DEPRESSED OR HOPELESS: NOT AT ALL
SUM OF ALL RESPONSES TO PHQ QUESTIONS 1-9: 0

## 2024-10-08 ENCOUNTER — ROUTINE PRENATAL (OUTPATIENT)
Dept: OBGYN CLINIC | Age: 34
End: 2024-10-08

## 2024-10-08 VITALS — SYSTOLIC BLOOD PRESSURE: 118 MMHG | DIASTOLIC BLOOD PRESSURE: 72 MMHG | WEIGHT: 214 LBS | BODY MASS INDEX: 37.91 KG/M2

## 2024-10-08 DIAGNOSIS — O99.212 OBESITY AFFECTING PREGNANCY IN SECOND TRIMESTER, UNSPECIFIED OBESITY TYPE: ICD-10-CM

## 2024-10-08 DIAGNOSIS — Z87.59 HISTORY OF OLIGOHYDRAMNIOS: ICD-10-CM

## 2024-10-08 DIAGNOSIS — D25.9 UTERINE FIBROID DURING PREGNANCY, ANTEPARTUM: ICD-10-CM

## 2024-10-08 DIAGNOSIS — I49.3 PVC (PREMATURE VENTRICULAR CONTRACTION): ICD-10-CM

## 2024-10-08 DIAGNOSIS — O09.92 HIGH-RISK PREGNANCY IN SECOND TRIMESTER: Primary | ICD-10-CM

## 2024-10-08 DIAGNOSIS — Z13.1 SCREENING FOR DIABETES MELLITUS (DM): ICD-10-CM

## 2024-10-08 DIAGNOSIS — Z13.0 SCREENING, ANEMIA, DEFICIENCY, IRON: ICD-10-CM

## 2024-10-08 DIAGNOSIS — O34.10 UTERINE FIBROID DURING PREGNANCY, ANTEPARTUM: ICD-10-CM

## 2024-10-08 DIAGNOSIS — Z87.59 HISTORY OF GESTATIONAL HYPERTENSION: ICD-10-CM

## 2024-10-08 DIAGNOSIS — Z98.891 HISTORY OF CESAREAN SECTION: ICD-10-CM

## 2024-10-08 DIAGNOSIS — Z3A.25 25 WEEKS GESTATION OF PREGNANCY: ICD-10-CM

## 2024-10-08 DIAGNOSIS — E53.8 VITAMIN B12 DEFICIENCY: ICD-10-CM

## 2024-10-08 PROCEDURE — 0502F SUBSEQUENT PRENATAL CARE: CPT | Performed by: NURSE PRACTITIONER

## 2024-10-08 NOTE — PROGRESS NOTES
OB return  aTna Healy is a 34 y.o. female   with 25w0d IUP with Estimated Date of Delivery: 25 presenting to the clinic as a routine OB.     She denies feeling regular contractions, leakage of fluid or vaginal bleeding and reports active fetal movement.     Denies HA, blurred vision or RUQ pain.     FHTs: 140s    Problem list reviewed and updated    Pregnancy complicated by:  1. Uterine fibroid: 5 cm fundal intramural fibroid noted on dating scan. 24 [facility]: Left lateral wall, 6.3 x 5.2 x 5.5 cm.    2. Vitamin B12 deficiency: Receiving B12 injections every 2 weeks, managed by PCP.  24: Vitamin B12- 754    3. Hx oligohydramnios    4. Hx GHTN: W/   Baseline HELLP labs normal  PC 0.1. 24 [facility]: Reports not currently taking LDA, recommendations reviewed.    5. PVC: In  was taking Adderall and experiencing chest pressure. Was seen by Cardiology with negative workup.  No recent Sx    6. Hx  section: W due to Breech presentation. Plans on repeat    7. Obesity: Pregravid BMI 36, Early HgA1c 5.1%.  2024 at [facility]: Normal anatomy/echo limited by fetal position/MBH, AC 70%, DARYC WNL, Negative NIPT. For full details review formal ultrasound report. Sees [facility]        Physical Examination:  /72   Wt 97.1 kg (214 lb)   LMP 2024   BMI 37.91 kg/m²    Gen: AAOx3  Ab: soft, NTTP, gravid uterus  Skin: no edema    Plan:  --Encouraged increase in water intake daily to prevent dehydration.   --RTC 4 weeks for amy. Sees [facility] . Will plan to collect gtt/cbc at that visit. Glucola and instruction sheet given to patient during visit today. Patient aware that labs to be drawn 1 hour after glucola complete.     PTL/labor precautions, FMC, and pregnancy warning signs reviewed. Pt advised to call the office at [phone] or go straight to Labor and Delivery at [facility] with any of the following concerns vaginal bleeding, leaking of fluid, christophe regularly Q 5-7

## 2024-10-08 NOTE — PATIENT INSTRUCTIONS
PTL/labor precautions, FMC, and pregnancy warning signs reviewed. Pt advised to call the office at [phone] or go straight to Labor and Delivery at [facility] with any of the following concerns vaginal bleeding, leaking of fluid, christophe regularly Q 5-7 minutes for over an hour.

## 2024-10-18 ENCOUNTER — CARE COORDINATION (OUTPATIENT)
Dept: OTHER | Facility: CLINIC | Age: 34
End: 2024-10-18

## 2024-10-18 NOTE — CARE COORDINATION
Ambulatory Care Coordination Note    ACM attempted to reach patient for  Associate Care Management related to MCM follow-up call. HIPAA compliant message left requesting a return phone call at patient convenience.     Plan for follow-up call in 10-14 days      Future Appointments   Date Time Provider Department Center   11/5/2024  2:00 PM [facility] ULTRASOUND 2 [facility] AMB   11/5/2024  2:50 PM Devika Catalan MD [facility] AMB   11/12/2024  8:30 AM Ena Villavicencio DO [facility]       ANGIE Avitia, RN  Associate Care Manager   Cell: [phone]  [email or web address]

## 2024-11-05 ENCOUNTER — TELEPHONE (OUTPATIENT)
Dept: FAMILY MEDICINE CLINIC | Facility: CLINIC | Age: 34
End: 2024-11-05

## 2024-11-05 ENCOUNTER — ROUTINE PRENATAL (OUTPATIENT)
Dept: OBGYN CLINIC | Age: 34
End: 2024-11-05
Payer: COMMERCIAL

## 2024-11-05 VITALS — SYSTOLIC BLOOD PRESSURE: 114 MMHG | DIASTOLIC BLOOD PRESSURE: 68 MMHG

## 2024-11-05 DIAGNOSIS — Z3A.29 29 WEEKS GESTATION OF PREGNANCY: ICD-10-CM

## 2024-11-05 DIAGNOSIS — Z98.891 HISTORY OF CESAREAN SECTION: ICD-10-CM

## 2024-11-05 DIAGNOSIS — Z87.59 HISTORY OF OLIGOHYDRAMNIOS: ICD-10-CM

## 2024-11-05 DIAGNOSIS — I49.3 PVC (PREMATURE VENTRICULAR CONTRACTION): ICD-10-CM

## 2024-11-05 DIAGNOSIS — O99.213 OBESITY AFFECTING PREGNANCY IN THIRD TRIMESTER, UNSPECIFIED OBESITY TYPE: ICD-10-CM

## 2024-11-05 DIAGNOSIS — D25.9 UTERINE FIBROID DURING PREGNANCY, ANTEPARTUM: ICD-10-CM

## 2024-11-05 DIAGNOSIS — E53.8 VITAMIN B12 DEFICIENCY: ICD-10-CM

## 2024-11-05 DIAGNOSIS — Z87.59 HISTORY OF GESTATIONAL HYPERTENSION: ICD-10-CM

## 2024-11-05 DIAGNOSIS — O09.93 HIGH-RISK PREGNANCY IN THIRD TRIMESTER: Primary | ICD-10-CM

## 2024-11-05 DIAGNOSIS — O34.10 UTERINE FIBROID DURING PREGNANCY, ANTEPARTUM: ICD-10-CM

## 2024-11-05 DIAGNOSIS — J06.9 UPPER RESPIRATORY TRACT INFECTION, UNSPECIFIED TYPE: ICD-10-CM

## 2024-11-05 DIAGNOSIS — J20.9 ACUTE BRONCHITIS, UNSPECIFIED ORGANISM: Primary | ICD-10-CM

## 2024-11-05 PROCEDURE — 76816 OB US FOLLOW-UP PER FETUS: CPT | Performed by: OBSTETRICS & GYNECOLOGY

## 2024-11-05 PROCEDURE — 93325 DOPPLER ECHO COLOR FLOW MAPG: CPT | Performed by: OBSTETRICS & GYNECOLOGY

## 2024-11-05 PROCEDURE — 76828 ECHO EXAM OF FETAL HEART: CPT | Performed by: OBSTETRICS & GYNECOLOGY

## 2024-11-05 PROCEDURE — 99214 OFFICE O/P EST MOD 30 MIN: CPT | Performed by: OBSTETRICS & GYNECOLOGY

## 2024-11-05 PROCEDURE — 76826 ECHO EXAM OF FETAL HEART: CPT | Performed by: OBSTETRICS & GYNECOLOGY

## 2024-11-05 RX ORDER — ALBUTEROL SULFATE 90 UG/1
2 INHALANT RESPIRATORY (INHALATION) 4 TIMES DAILY PRN
Qty: 18 G | Refills: 0 | Status: SHIPPED | OUTPATIENT
Start: 2024-11-05

## 2024-11-05 RX ORDER — AMOXICILLIN 875 MG/1
875 TABLET, COATED ORAL 2 TIMES DAILY
Qty: 20 TABLET | Refills: 0 | Status: SHIPPED | OUTPATIENT
Start: 2024-11-05 | End: 2024-11-15

## 2024-11-05 ASSESSMENT — PATIENT HEALTH QUESTIONNAIRE - PHQ9
2. FEELING DOWN, DEPRESSED OR HOPELESS: NOT AT ALL
SUM OF ALL RESPONSES TO PHQ QUESTIONS 1-9: 0
SUM OF ALL RESPONSES TO PHQ QUESTIONS 1-9: 0
1. LITTLE INTEREST OR PLEASURE IN DOING THINGS: NOT AT ALL
SUM OF ALL RESPONSES TO PHQ9 QUESTIONS 1 & 2: 0
SUM OF ALL RESPONSES TO PHQ QUESTIONS 1-9: 0
SUM OF ALL RESPONSES TO PHQ QUESTIONS 1-9: 0

## 2024-11-05 NOTE — PATIENT INSTRUCTIONS
Your Maternity Check List   Before Arriving to the Hospital     Find an OBGYN Doctor: [email or web address]  Maternity Pre-registration for Hospital: [email or web address]  Sign up for a Hospital Tour: [email or web address]  [place] for Prenatal Classes: [email or web address]   Car seat Safety Check: [email or web address]   Learn More: [email or web address]   Download the [employer] Pregnancy Mi: (Scan QR Code below):  See educational videos, track your pregnancy, and Mom/Baby Care videos          Resources for Depression/Anxiety  Postpartum Support International (PSI).    PSI Warmline:  6-937-590-4PPD (9106).  WWW.POSTPARTUM.NET    Mom's IMPACTT  [email or web address]       In order to optimize maternal, fetal, and  health, we recommend the following vaccinations.   Flu- yearly (https://www.highriskpregnancyinfo.org/flu-facts-for-pregnancy)  Consider Covid vaccination/booster (https://www.highriskpregnancyinfo.org/covid-19-pregnancy)  TDaP after 28 weeks each pregnancy (https://www.highriskpregnancyinfo.org/tdap)  Consider RSV vaccine 32-36 weeks of pregnancy, if Sept- January.  (https://www.highriskpregnancyinfo.org/rsv)

## 2024-11-05 NOTE — TELEPHONE ENCOUNTER
Patient has developed some chest congestion, wheezing and a cough in the last few days.  Listening to her lungs she has some slight wheezing in her anterior chest but lungs were otherwise clear no crackles.  She does see her gynecologist today and I suggested she ask them about Mucinex and we will send in some albuterol and amoxicillin for bronchitis.  Sent in prescription for:     Requested Prescriptions     Signed Prescriptions Disp Refills    amoxicillin (AMOXIL) 875 MG tablet 20 tablet 0     Sig: Take 1 tablet by mouth 2 times daily for 10 days     Authorizing Provider: ITALIA BENSON    albuterol sulfate HFA (VENTOLIN HFA) 108 (90 Base) MCG/ACT inhaler 18 g 0     Sig: Inhale 2 puffs into the lungs 4 times daily as needed for Wheezing     Authorizing Provider: ITALIA BENSON

## 2024-11-05 NOTE — PROGRESS NOTES
[facility] MATERNAL FETAL MEDICINE    [address] [place] [address]  P- [phone]  S-[phone]     MFM Follow-up Visit  Tana Healy (1990) is a 34 y.o.  at 29w0d with 2025, by Ultrasound.   Presents for evaluation of the following chief complaint(s):   Chief Complaint   Patient presents with    High Risk Pregnancy     BMI > 35, H/o C/S, H/o GHTN, Fibroid     Patient is working full time as a MA at [employer]   Patient is scheduled to see Primary OB ([facility]) on 24.    Interval history since prior appt reviewed and chart updated as indicated.    No HAs, edema.  Denies preeclamptic symptoms.  Reports good fetal movement.  No bleeding, LOF, cramping, ctxs, or vaginal pressure.  Reports cough and congestion, Albuterol inhaler and Amoxil sent in by PCP today. Safe OTC medications reviewed.     Recent bartholins, improving.     Mood evaluated today based on discussion with pt and PHQ screen.       2024     2:39 PM   PHQ-9    Little interest or pleasure in doing things 0   Feeling down, depressed, or hopeless 0   PHQ-2 Score 0   PHQ-9 Total Score 0      Mood Reassuring today  Recommend lifestyle/behavioral modifications to enhance mood (exercise, sunshine, mood apps, nutritional modifications, etc).     Addressed normal pregnancy complaints, reassured and offered suggestions for care  Reviewed gestational age precautions and activity goals/limitations  Nutritional counseling as well as specific goals based on current maternal and fetal status  Options for GERD, constipation, other common complaints reviewed.   Reviewed gestational age appropriate preventive care regarding communicable disease transmission and vaccines as appropriate (including flu, TDaP >28wk, RSV 32-36wk, and COVID.)    Exam:     Vitals:    24 1453   BP: 114/68      Physical Exam  Applicable labs reviewed.   Please see formal ultrasound report under imaging tab.

## 2024-11-07 ENCOUNTER — TELEPHONE (OUTPATIENT)
Dept: FAMILY MEDICINE CLINIC | Facility: CLINIC | Age: 34
End: 2024-11-07

## 2024-11-07 ENCOUNTER — NURSE ONLY (OUTPATIENT)
Dept: FAMILY MEDICINE CLINIC | Facility: CLINIC | Age: 34
End: 2024-11-07

## 2024-11-07 DIAGNOSIS — J20.9 ACUTE BRONCHITIS, UNSPECIFIED ORGANISM: Primary | ICD-10-CM

## 2024-11-07 RX ORDER — TRIAMCINOLONE ACETONIDE 40 MG/ML
40 INJECTION, SUSPENSION INTRA-ARTICULAR; INTRAMUSCULAR ONCE
Status: COMPLETED | OUTPATIENT
Start: 2024-11-07 | End: 2024-11-07

## 2024-11-07 RX ORDER — METHYLPREDNISOLONE 4 MG/1
TABLET ORAL
Qty: 1 KIT | Refills: 0 | Status: SHIPPED | OUTPATIENT
Start: 2024-11-07

## 2024-11-07 RX ADMIN — TRIAMCINOLONE ACETONIDE 40 MG: 40 INJECTION, SUSPENSION INTRA-ARTICULAR; INTRAMUSCULAR at 15:57

## 2024-11-07 NOTE — TELEPHONE ENCOUNTER
Pt with continued chest congestion and cough.  Did speak with OB Provider.   Is ok to receive a steriod.

## 2024-11-07 NOTE — TELEPHONE ENCOUNTER
Due to persistent wheezing with cough we will try a shot of Kenalog first and she will hold off on the Medrol Dosepak.  She did speak with her OB who felt an injection would be okay.

## 2024-11-07 NOTE — TELEPHONE ENCOUNTER
Sent in prescription for:     Requested Prescriptions     Signed Prescriptions Disp Refills    methylPREDNISolone (MEDROL, KAROLYN,) 4 MG tablet 1 kit 0     Sig: Take by mouth.     Authorizing Provider: ITALIA BENSON

## 2024-11-11 NOTE — PROGRESS NOTES
LOAN. .  29w6d   Denies LOF, VB, Ctxs.  Good FM.      Asking about risk reducing salpingectomy at time of CS    Vitals:    24 0821   BP: 124/80        General: well appearing NAD  Psych: normal affect  Abdomen: gravid   LE: no edema    High-risk pregnancy in third trimester  GTT and CBC today   Kick counts and labor precautions reviewed    Planning Flu and TDAP at her work   Request placed for CS  with me    Discussion of route of delivery possibilities. If patient requires a  delivery, she is interested in opportunistic risk reducing salpingectomy at that time due increased risk for CA based on obesity (pregravid BMI >35)    Discussed at length the risks and benefits of concurrent bilateral salpingectomy with patient's upcoming scheduled abdominopelvic surgery per recommendation of the Society of Gynecologic Oncology and the American College of Obstetricians and Gynecologists as this patient is at above average risk for development of ovarian cancer. Advised patient that the primary benefit of such surgery is a 65% reduction in future risk of ovarian cancer. Patient advised that large scale studies have not demonstrated an increase in estimated blood loss, complications, or operating time but that bleeding, infection, and injury to nearby organs are potential complications with this additional surgery.     Finally, patient has been thoroughly counseled regarding the consequence of loss of fertility following this procedure. Pt is aware that removal of fallopian tubes for cancer risk reduction results in inability to naturally conceive further children. If in the future pregnancy is desired, she would need IVF or would have to adopt. Patient understands that this loss of fertility cannot be reversed.     She desires to minimize risk of cancer if possible. Pt expresses understanding and verbal consent that her wishes are to proceed with this surgery for the purposes of ovarian cancer

## 2024-11-12 ENCOUNTER — ROUTINE PRENATAL (OUTPATIENT)
Dept: OBGYN CLINIC | Age: 34
End: 2024-11-12

## 2024-11-12 VITALS — SYSTOLIC BLOOD PRESSURE: 124 MMHG | DIASTOLIC BLOOD PRESSURE: 80 MMHG | BODY MASS INDEX: 38.26 KG/M2 | WEIGHT: 216 LBS

## 2024-11-12 DIAGNOSIS — E53.8 VITAMIN B12 DEFICIENCY: ICD-10-CM

## 2024-11-12 DIAGNOSIS — O99.213 OBESITY AFFECTING PREGNANCY IN THIRD TRIMESTER, UNSPECIFIED OBESITY TYPE: ICD-10-CM

## 2024-11-12 DIAGNOSIS — O34.10 UTERINE FIBROID DURING PREGNANCY, ANTEPARTUM: ICD-10-CM

## 2024-11-12 DIAGNOSIS — I49.3 PVC (PREMATURE VENTRICULAR CONTRACTION): ICD-10-CM

## 2024-11-12 DIAGNOSIS — O09.93 HIGH-RISK PREGNANCY IN THIRD TRIMESTER: Primary | ICD-10-CM

## 2024-11-12 DIAGNOSIS — Z91.89 AT RISK FOR CANCER: ICD-10-CM

## 2024-11-12 DIAGNOSIS — Z13.1 SCREENING FOR DIABETES MELLITUS (DM): ICD-10-CM

## 2024-11-12 DIAGNOSIS — Z87.59 HISTORY OF OLIGOHYDRAMNIOS: ICD-10-CM

## 2024-11-12 DIAGNOSIS — Z87.59 HISTORY OF GESTATIONAL HYPERTENSION: ICD-10-CM

## 2024-11-12 DIAGNOSIS — D25.9 UTERINE FIBROID DURING PREGNANCY, ANTEPARTUM: ICD-10-CM

## 2024-11-12 DIAGNOSIS — Z13.0 SCREENING, ANEMIA, DEFICIENCY, IRON: ICD-10-CM

## 2024-11-12 DIAGNOSIS — Z98.891 HISTORY OF CESAREAN SECTION: ICD-10-CM

## 2024-11-12 LAB
ERYTHROCYTE [DISTWIDTH] IN BLOOD BY AUTOMATED COUNT: 12.8 % (ref 11.9–14.6)
GLUCOSE 1 HOUR: 78 MG/DL
HCT VFR BLD AUTO: 36.2 % (ref 35.8–46.3)
HGB BLD-MCNC: 11.9 G/DL (ref 11.7–15.4)
MCH RBC QN AUTO: 30.9 PG (ref 26.1–32.9)
MCHC RBC AUTO-ENTMCNC: 32.9 G/DL (ref 31.4–35)
MCV RBC AUTO: 94 FL (ref 82–102)
NRBC # BLD: 0 K/UL (ref 0–0.2)
PLATELET # BLD AUTO: 402 K/UL (ref 150–450)
PMV BLD AUTO: 9.9 FL (ref 9.4–12.3)
RBC # BLD AUTO: 3.85 M/UL (ref 4.05–5.2)
WBC # BLD AUTO: 11.3 K/UL (ref 4.3–11.1)

## 2024-11-12 PROCEDURE — 0502F SUBSEQUENT PRENATAL CARE: CPT | Performed by: OBSTETRICS & GYNECOLOGY

## 2024-11-13 ENCOUNTER — CARE COORDINATION (OUTPATIENT)
Dept: OTHER | Facility: CLINIC | Age: 34
End: 2024-11-13

## 2024-11-13 NOTE — CARE COORDINATION
c/section  Maternity Classes? No        Patient given an opportunity to ask questions and does not have any further questions or concerns at this time. Were discharge instructions available to patient? Yes. Reviewed appropriate site of care based on symptoms and resources available to patient including: PCP  Specialist  Benefits related nurse triage line  Urgent care clinics  When to call 911  [employer] Messaging. The patient agrees to contact the OB/Gyn office for questions related to their healthcare.    Plan for a follow-up call in 4 weeks  based on severity of symptoms and risk factors.  Plan for next call: symptom management-any new s/s?  self management-any new concerns?  follow up appointment-how did repeat OBV go?       Goals        Attends follow-up appointments as directed      Educate and encourage importance of FU for prevention of complications or disease;  Assess the patient's relationship with a PCP and next FU visit scheduled;  Discuss importance of adherence to treatment plan and follow up visits;  Identify any barriers in transportation or access to FU appointments.   Assist patient with making FU appointments as needed;   It's also a good idea to know your test results.  Keep a list of the medicines you take.       Patient will identify signs and symptoms requiring evaluation and intervention      Demonstrate how client is to evaluate contraction activity after discharge (e.g., lying down, tilted to the side with a pillow to the back, placing fingertips on the fundus for approximately 1 hour to note hardening or tightening of the uterus).  Identify signs and/or symptoms that should be reported immediately to the healthcare provider (e.g, sustained uterine contractions, clear drainage from vagina, bleeding).  Provide information about follow-up care when client is discharged.  Advise client to empty bladder every two (2) hours while awake  Review daily fluid need; avoid coffee.  Encourage regular

## 2024-11-19 ENCOUNTER — TELEPHONE (OUTPATIENT)
Dept: FAMILY MEDICINE CLINIC | Facility: CLINIC | Age: 34
End: 2024-11-19
Payer: COMMERCIAL

## 2024-11-19 DIAGNOSIS — Z23 ENCOUNTER FOR IMMUNIZATION: Primary | ICD-10-CM

## 2024-11-19 PROCEDURE — 90715 TDAP VACCINE 7 YRS/> IM: CPT | Performed by: FAMILY MEDICINE

## 2024-11-19 PROCEDURE — 90471 IMMUNIZATION ADMIN: CPT | Performed by: FAMILY MEDICINE

## 2024-11-25 ENCOUNTER — ROUTINE PRENATAL (OUTPATIENT)
Dept: OBGYN CLINIC | Age: 34
End: 2024-11-25

## 2024-11-25 VITALS — WEIGHT: 216 LBS | SYSTOLIC BLOOD PRESSURE: 112 MMHG | BODY MASS INDEX: 38.26 KG/M2 | DIASTOLIC BLOOD PRESSURE: 68 MMHG

## 2024-11-25 DIAGNOSIS — Z91.89 AT RISK FOR CANCER: ICD-10-CM

## 2024-11-25 DIAGNOSIS — Z3A.31 31 WEEKS GESTATION OF PREGNANCY: ICD-10-CM

## 2024-11-25 DIAGNOSIS — Z98.891 HISTORY OF CESAREAN SECTION: ICD-10-CM

## 2024-11-25 DIAGNOSIS — Z87.59 HISTORY OF OLIGOHYDRAMNIOS: ICD-10-CM

## 2024-11-25 DIAGNOSIS — O34.10 UTERINE FIBROID DURING PREGNANCY, ANTEPARTUM: ICD-10-CM

## 2024-11-25 DIAGNOSIS — I49.3 PVC (PREMATURE VENTRICULAR CONTRACTION): ICD-10-CM

## 2024-11-25 DIAGNOSIS — Z87.59 HISTORY OF GESTATIONAL HYPERTENSION: ICD-10-CM

## 2024-11-25 DIAGNOSIS — O99.213 OBESITY AFFECTING PREGNANCY IN THIRD TRIMESTER, UNSPECIFIED OBESITY TYPE: ICD-10-CM

## 2024-11-25 DIAGNOSIS — D25.9 UTERINE FIBROID DURING PREGNANCY, ANTEPARTUM: ICD-10-CM

## 2024-11-25 DIAGNOSIS — O09.93 HIGH-RISK PREGNANCY IN THIRD TRIMESTER: Primary | ICD-10-CM

## 2024-11-25 DIAGNOSIS — E53.8 VITAMIN B12 DEFICIENCY: ICD-10-CM

## 2024-11-25 PROCEDURE — 0501F PRENATAL FLOW SHEET: CPT | Performed by: NURSE PRACTITIONER

## 2024-11-25 NOTE — PROGRESS NOTES
OB return  Tana Healy is a 34 y.o. female   with 31w6d IUP with Estimated Date of Delivery: 25 presenting to the clinic as a routine OB.     She denies feeling regular contractions, leakage of fluid or vaginal bleeding and reports active fetal movement.      Denies HA, blurred vision or RUQ pain.     FH: 33cm  FHTs: 130s    Problem list reviewed and updated    Pregnancy complicated by:  1. Uterine fibroid: 5 cm fundal intramural fibroid noted on dating scan. 24 UMFM: Left lateral wall, 6.3 x 5.2 x 5.5 cm. 24 [facility]: Stable at 5.9 cm.      2. Vitamin B12 deficiency: Receiving B12 injections every 2 weeks, managed by PCP.  24: Vitamin B12- 754     3. Hx oligohydramnios     4. Hx GHTN: W/   Baseline HELLP labs normal  PC 0.1. 24 [facility]: Reports not currently taking LDA, recommendations reviewed.     5. PVC: In  was taking Adderall and experiencing chest pressure. Was seen by Cardiology with negative workup.  No recent Sx     6. Hx  section:  due to Breech presentation. Plans on repeat     7. Obesity: Pregravid BMI 36, Early HgA1c 5.1%.  24 UMFM: Reassuring fetal status. Growth today appropriate EFW appropriate AC; DARCY- WNL. For full details review formal ultrasound report. Sees [facility]     8. At risk for cancer: Desire for RR salpingectomy at time of CS  Pregravid BMI > 35 = RF  Email sent  to ethics for approval     Physical Examination:  /68   Wt 98 kg (216 lb)   LMP 2024   BMI 38.26 kg/m²    Gen: AAOx3  Ab: soft, NTTP, gravid uterus  Skin: no edema    Plan:  --Patient states did receive Tdap 2024 and Flu Vaccine the week before last (week of 2024) at her place of employment  --Encouraged increase in water intake daily to prevent dehydration.   --RTC 2 weeks for amy. Sees [facility]     PTL/labor precautions, FMC, and pregnancy warning signs reviewed. Pt advised to call the office at [phone] or go straight to Labor and

## 2024-11-25 NOTE — PATIENT INSTRUCTIONS
PTL/labor precautions, FMC, and pregnancy warning signs reviewed. Pt advised to call the office at [phone] or go straight to Labor and Delivery at [facility] with any of the following concerns vaginal bleeding, leaking of fluid, christophe regularly Q 5-7 minutes for over an hour or not feeling the baby move.     Kick counts and pre-term labor precautions reviewed.

## 2024-11-26 ENCOUNTER — TELEPHONE (OUTPATIENT)
Dept: OBGYN CLINIC | Age: 34
End: 2024-11-26

## 2024-11-26 NOTE — TELEPHONE ENCOUNTER
----- Message from Ursula MAS sent at 11/12/2024  2:17 PM EST -----  Regarding: FW: CS with RR salpingectomy    ----- Message -----  From: Sheela Loo, ИРИНА  Sent: 11/12/2024  11:11 AM EST  To: Ursula Veliz, OTR/L  Subject: FW: CS with RR salpingectomy                       ----- Message -----  From: Ena Villavicencio DO  Sent: 11/12/2024   8:45 AM EST  To: Leatha Simmons, ИРИНА  Subject: CS with RR salpingectomy                         Please schedule for 1/16 CS after 9am with me   TERI  GA 39w2d

## 2024-11-26 NOTE — TELEPHONE ENCOUNTER
C section scheduled with [facility].  C section 1/19 at 10 AM with Dr Villavicencio d/t repeat, c/s with RR bilateral salpingectomy.   Pt notified of instructions.

## 2024-12-10 ENCOUNTER — TELEPHONE (OUTPATIENT)
Dept: INTERNAL MEDICINE CLINIC | Facility: CLINIC | Age: 34
End: 2024-12-10

## 2024-12-10 ENCOUNTER — ROUTINE PRENATAL (OUTPATIENT)
Dept: OBGYN CLINIC | Age: 34
End: 2024-12-10

## 2024-12-10 VITALS — DIASTOLIC BLOOD PRESSURE: 84 MMHG | WEIGHT: 220 LBS | BODY MASS INDEX: 38.97 KG/M2 | SYSTOLIC BLOOD PRESSURE: 126 MMHG

## 2024-12-10 DIAGNOSIS — E53.8 VITAMIN B12 DEFICIENCY: ICD-10-CM

## 2024-12-10 DIAGNOSIS — Z87.59 HISTORY OF OLIGOHYDRAMNIOS: ICD-10-CM

## 2024-12-10 DIAGNOSIS — O09.93 HIGH-RISK PREGNANCY IN THIRD TRIMESTER: ICD-10-CM

## 2024-12-10 DIAGNOSIS — O99.213 OBESITY AFFECTING PREGNANCY IN THIRD TRIMESTER, UNSPECIFIED OBESITY TYPE: ICD-10-CM

## 2024-12-10 DIAGNOSIS — Z98.891 HISTORY OF CESAREAN SECTION: ICD-10-CM

## 2024-12-10 DIAGNOSIS — D25.9 UTERINE FIBROID DURING PREGNANCY, ANTEPARTUM: ICD-10-CM

## 2024-12-10 DIAGNOSIS — Z87.59 HISTORY OF GESTATIONAL HYPERTENSION: ICD-10-CM

## 2024-12-10 DIAGNOSIS — Z91.89 AT RISK FOR CANCER: Primary | ICD-10-CM

## 2024-12-10 DIAGNOSIS — O34.10 UTERINE FIBROID DURING PREGNANCY, ANTEPARTUM: ICD-10-CM

## 2024-12-10 DIAGNOSIS — I49.3 PVC (PREMATURE VENTRICULAR CONTRACTION): ICD-10-CM

## 2024-12-10 PROCEDURE — 0502F SUBSEQUENT PRENATAL CARE: CPT | Performed by: OBSTETRICS & GYNECOLOGY

## 2024-12-10 NOTE — PROGRESS NOTES
LOAN. .  34w0d   Denies LOF, VB, Ctxs.  Good FM.      Vitals:    12/10/24 1325   BP: 126/84        General: well appearing NAD  Psych: normal affect  Abdomen: gravid   LE: no edema    At risk for cancer  Desire for RR salpingectomy at time of CS  Pregravid BMI > 35 = RF  Email sent  to ethics for approval   12/10 approval email received     High-risk pregnancy in third trimester  28 week labs wnl   GBS at [place]  Kick counts and labor precautions reviewed         S/p TDAP and flu   Declines RSV     Ena Villavicencio DO

## 2024-12-10 NOTE — ASSESSMENT & PLAN NOTE
Desire for RR salpingectomy at time of CS  Pregravid BMI > 35 = RF  Email sent 11/12 to ethics for approval   12/10 approval email not yet received. 2nd email sent today.    78

## 2024-12-10 NOTE — TELEPHONE ENCOUNTER
Patient was paying on her balance and the incorrect amount was taken out. Went into transact and voided the payment.

## 2024-12-12 ENCOUNTER — ROUTINE PRENATAL (OUTPATIENT)
Dept: OBGYN CLINIC | Age: 34
End: 2024-12-12
Payer: COMMERCIAL

## 2024-12-12 VITALS — DIASTOLIC BLOOD PRESSURE: 83 MMHG | SYSTOLIC BLOOD PRESSURE: 130 MMHG

## 2024-12-12 DIAGNOSIS — Z91.89 AT RISK FOR CANCER: ICD-10-CM

## 2024-12-12 DIAGNOSIS — O34.10 UTERINE FIBROID DURING PREGNANCY, ANTEPARTUM: ICD-10-CM

## 2024-12-12 DIAGNOSIS — O09.93 HIGH-RISK PREGNANCY IN THIRD TRIMESTER: Primary | ICD-10-CM

## 2024-12-12 DIAGNOSIS — D25.9 UTERINE FIBROID DURING PREGNANCY, ANTEPARTUM: ICD-10-CM

## 2024-12-12 DIAGNOSIS — I49.3 PVC (PREMATURE VENTRICULAR CONTRACTION): ICD-10-CM

## 2024-12-12 DIAGNOSIS — Z87.59 HISTORY OF OLIGOHYDRAMNIOS: ICD-10-CM

## 2024-12-12 DIAGNOSIS — Z3A.34 34 WEEKS GESTATION OF PREGNANCY: ICD-10-CM

## 2024-12-12 DIAGNOSIS — O99.213 OBESITY AFFECTING PREGNANCY IN THIRD TRIMESTER, UNSPECIFIED OBESITY TYPE: ICD-10-CM

## 2024-12-12 DIAGNOSIS — E53.8 VITAMIN B12 DEFICIENCY: ICD-10-CM

## 2024-12-12 DIAGNOSIS — Z87.59 HISTORY OF GESTATIONAL HYPERTENSION: ICD-10-CM

## 2024-12-12 DIAGNOSIS — Z98.891 HISTORY OF CESAREAN SECTION: ICD-10-CM

## 2024-12-12 PROCEDURE — 76816 OB US FOLLOW-UP PER FETUS: CPT | Performed by: OBSTETRICS & GYNECOLOGY

## 2024-12-12 PROCEDURE — 76826 ECHO EXAM OF FETAL HEART: CPT | Performed by: OBSTETRICS & GYNECOLOGY

## 2024-12-12 PROCEDURE — 99214 OFFICE O/P EST MOD 30 MIN: CPT | Performed by: OBSTETRICS & GYNECOLOGY

## 2024-12-12 PROCEDURE — 76819 FETAL BIOPHYS PROFIL W/O NST: CPT | Performed by: OBSTETRICS & GYNECOLOGY

## 2024-12-12 PROCEDURE — 76828 ECHO EXAM OF FETAL HEART: CPT | Performed by: OBSTETRICS & GYNECOLOGY

## 2024-12-12 PROCEDURE — 93325 DOPPLER ECHO COLOR FLOW MAPG: CPT | Performed by: OBSTETRICS & GYNECOLOGY

## 2024-12-12 ASSESSMENT — PATIENT HEALTH QUESTIONNAIRE - PHQ9
SUM OF ALL RESPONSES TO PHQ9 QUESTIONS 1 & 2: 0
SUM OF ALL RESPONSES TO PHQ QUESTIONS 1-9: 0
SUM OF ALL RESPONSES TO PHQ QUESTIONS 1-9: 0
2. FEELING DOWN, DEPRESSED OR HOPELESS: NOT AT ALL
1. LITTLE INTEREST OR PLEASURE IN DOING THINGS: NOT AT ALL
SUM OF ALL RESPONSES TO PHQ QUESTIONS 1-9: 0
SUM OF ALL RESPONSES TO PHQ QUESTIONS 1-9: 0

## 2024-12-12 NOTE — PROGRESS NOTES
[facility] MATERNAL FETAL MEDICINE    [address] [place] [address]  P- [phone]  [phone]     MFM Follow-up Visit  Tana Healy (1990) is a 34 y.o.  at 34w2d with 2025, by Ultrasound.   Presents for evaluation of the following chief complaint(s):   Chief Complaint   Patient presents with    High Risk Pregnancy     BMI > 35, H/o C/S, H/o GHTN, Fibroid       Patient is working full time as a MA at [employer]   Patient is scheduled to see Primary OB ([facility]) on 24.    Interval history since prior appt reviewed and chart updated as indicated.    No HAs, edema.  Denies preeclamptic symptoms.  Reports good fetal movement.  No bleeding, LOF, cramping, ctxs, or vaginal pressure.        Mood evaluated today based on discussion with pt and PHQ screen.       2024    10:14 AM   PHQ-9    Little interest or pleasure in doing things 0   Feeling down, depressed, or hopeless 0   PHQ-2 Score 0   PHQ-9 Total Score 0      Mood Reassuring today  Recommend lifestyle/behavioral modifications to enhance mood (exercise, sunshine, mood apps, nutritional modifications, etc).     Reviewed gestational age precautions and activity goals/limitations  Nutritional counseling as well as specific goals based on current maternal and fetal status    Addressed normal pregnancy complaints, reassured and offered suggestions for care  Options for GERD, constipation, other common complaints reviewed    Reviewed gestational age appropriate preventive care regarding communicable disease transmission and vaccines as appropriate (including flu, TDaP >28wk, RSV 32-36wk (-), as well as, COVID.)  All questions answered and concerns discussed. Additional recommendations in problem list.     Exam:     Vitals:    24 1013   BP: 130/83      Physical Exam  Applicable labs reviewed.   Please see formal ultrasound report under imaging tab.      ASSESSMENT/PLAN:    Patient

## 2024-12-12 NOTE — PATIENT INSTRUCTIONS
Your Maternity Check List   Before Arriving to the Hospital     Find an OBGYN Doctor: [email or web address]  Maternity Pre-registration for Hospital: [email or web address]  Sign up for a Hospital Tour: [email or web address].[email or web address]/about-us/classes-events  [place] for Prenatal Classes: www.[email or web address]/about-us/classes-events   Car seat Safety Check: https://www.[employer].org/coalition/safe-kids-[facility]   Learn More: www.[employer]/health-care-services/womens-health/maternity   Download the 8Trip Pregnancy Mi: (Scan QR Code below):  See educational videos, track your pregnancy, and Mom/Baby Care videos          Resources for Depression/Anxiety  Postpartum Support International (PSI).    PSI Warmline:  3-325-540-4PPD (2308).  WWW.POSTPARTUM.NET    Mom's IMPACTT  [email or web address]-[email or web address]/[email or web address]/reproductive-[email or web address]-health/moms-impactt       In order to optimize maternal, fetal, and  health, we recommend the following vaccinations.   Flu- yearly (https://www.highriskpregnancyinfo.org/flu-facts-for-pregnancy)  Consider Covid vaccination/booster (https://www.highriskpregnancyinfo.org/covid-19-pregnancy)  TDaP after 28 weeks each pregnancy (https://www.highriskpregnancyinfo.org/tdap)  Consider RSV vaccine 32-36 weeks of pregnancy, if Sept- January.  (https://www.highriskpregnancyinfo.org/rsv)

## 2024-12-26 ENCOUNTER — ROUTINE PRENATAL (OUTPATIENT)
Dept: OBGYN CLINIC | Age: 34
End: 2024-12-26

## 2024-12-26 VITALS — BODY MASS INDEX: 39.5 KG/M2 | WEIGHT: 223 LBS | DIASTOLIC BLOOD PRESSURE: 78 MMHG | SYSTOLIC BLOOD PRESSURE: 122 MMHG

## 2024-12-26 DIAGNOSIS — Z87.59 HISTORY OF GESTATIONAL HYPERTENSION: ICD-10-CM

## 2024-12-26 DIAGNOSIS — Z87.59 HISTORY OF OLIGOHYDRAMNIOS: ICD-10-CM

## 2024-12-26 DIAGNOSIS — O09.93 HIGH-RISK PREGNANCY IN THIRD TRIMESTER: ICD-10-CM

## 2024-12-26 DIAGNOSIS — Z98.891 HISTORY OF CESAREAN SECTION: ICD-10-CM

## 2024-12-26 DIAGNOSIS — Z91.89 AT RISK FOR CANCER: ICD-10-CM

## 2024-12-26 DIAGNOSIS — Z36.85 ANTENATAL SCREENING FOR STREPTOCOCCUS B: ICD-10-CM

## 2024-12-26 DIAGNOSIS — D25.9 UTERINE FIBROID DURING PREGNANCY, ANTEPARTUM: ICD-10-CM

## 2024-12-26 DIAGNOSIS — O34.10 UTERINE FIBROID DURING PREGNANCY, ANTEPARTUM: ICD-10-CM

## 2024-12-26 DIAGNOSIS — I49.3 PVC (PREMATURE VENTRICULAR CONTRACTION): Primary | ICD-10-CM

## 2024-12-26 DIAGNOSIS — O99.213 OBESITY AFFECTING PREGNANCY IN THIRD TRIMESTER, UNSPECIFIED OBESITY TYPE: ICD-10-CM

## 2024-12-26 DIAGNOSIS — E53.8 VITAMIN B12 DEFICIENCY: ICD-10-CM

## 2024-12-26 PROCEDURE — 0502F SUBSEQUENT PRENATAL CARE: CPT | Performed by: OBSTETRICS & GYNECOLOGY

## 2024-12-26 NOTE — PROGRESS NOTES
LOAN. .  36w2d   Denies LOF, VB, Ctxs.  Good FM.      Vitals:    24 1436   BP: 122/78        General: well appearing NAD  Psych: normal affect  Abdomen: gravid   LE: no edema  GBS done     At risk for cancer  Desire for RR salpingectomy at time of CS  Pregravid BMI > 35 = RF  Email sent  to ethics for approval   12/10 approval email received      High-risk pregnancy in third trimester  28 week labs wnl   GBS today  RCS scheduled   Kick counts and labor precautions reviewed       last US. MFM recommend 1 more at 38 weeks. DARCY 25 on last  Growth scheduled for       S/p TDAP and flu   Declines RSV     Orders Placed This Encounter   Procedures    Culture, Strep B Screen, Vaginal/Rectal     Standing Status:   Future     Standing Expiration Date:   2025        Ena Villavicencio DO

## 2024-12-29 LAB
BACTERIA SPEC CULT: NORMAL
SERVICE CMNT-IMP: NORMAL

## 2025-01-01 NOTE — PROGRESS NOTES
OB return  Tana Healy is a 34 y.o. female   with 37w2d IUP with Estimated Date of Delivery: 25 presenting to the clinic as a routine OB.     She denies feeling regular contractions, leakage of fluid or vaginal bleeding and reports active fetal movement.     Reports headache this am however, states \"I have not eaten this morning yet.\" She denies experiencing headaches frequently.   She denies blurred vision or RUQ pain.     FHTs: 140s    Problem list reviewed and updated    Pregnancy complicated by:  1. Uterine fibroid: 5 cm fundal intramural fibroid noted on dating scan. 24 [facility]: Left lateral wall, 6.3 x 5.2 x 5.5 cm. 24 [facility]: Stable at 5.9 cm. 24 [facility]: Fibroid stable.      2. Vitamin B12 deficiency: Receiving B12 injections every 2 weeks, managed by PCP.  24: Vitamin B12- 754     3. Hx oligohydramnios     4. Hx GHTN:   Baseline HELLP labs normal  PC 0.1. 24 [facility]: Reports not currently taking LDA, recommendations reviewed.     5. PVC: In  was taking Adderall and experiencing chest pressure. Was seen by Cardiology with negative workup.  No recent Sx     6. Hx  section:  due to Breech presentation. Plans on repeat     7. Obesity: Pregravid BMI 36, Early HgA1c 5.1%. 24 UMFM: Reassuring fetal status. Growth today appropriate EFW appropriate AC; DARCY- 25.2 cm, BPP- 8/8. For full details review formal ultrasound report.      8. At risk for cancer: APPROVED, Desire for RR salpingectomy at time of CS  Pregravid BMI > 35 = RF  Email sent  to ethics for approval, 12/10 approval email received.     Physical Examination:  /76   Wt 102.1 kg (225 lb)   LMP 2024   BMI 39.86 kg/m²    Gen: AAOx3  Ab: soft, NTTP, gravid uterus  Skin: no edema    Plan:  --GBS neg, discussed results with patient today.   --Hgb: 11.9 collected on .   --Discussed repeat  scheduled for , also informed her that RR salpingectomy was approved and will

## 2025-01-02 ENCOUNTER — TELEPHONE (OUTPATIENT)
Dept: FAMILY MEDICINE CLINIC | Facility: CLINIC | Age: 35
End: 2025-01-02

## 2025-01-02 ENCOUNTER — ROUTINE PRENATAL (OUTPATIENT)
Dept: OBGYN CLINIC | Age: 35
End: 2025-01-02

## 2025-01-02 VITALS — WEIGHT: 225 LBS | BODY MASS INDEX: 39.86 KG/M2 | DIASTOLIC BLOOD PRESSURE: 76 MMHG | SYSTOLIC BLOOD PRESSURE: 124 MMHG

## 2025-01-02 DIAGNOSIS — E53.8 VITAMIN B12 DEFICIENCY: ICD-10-CM

## 2025-01-02 DIAGNOSIS — Z87.59 HISTORY OF GESTATIONAL HYPERTENSION: ICD-10-CM

## 2025-01-02 DIAGNOSIS — I49.3 PVC (PREMATURE VENTRICULAR CONTRACTION): ICD-10-CM

## 2025-01-02 DIAGNOSIS — O09.93 HIGH-RISK PREGNANCY IN THIRD TRIMESTER: Primary | ICD-10-CM

## 2025-01-02 DIAGNOSIS — Z91.89 AT RISK FOR CANCER: ICD-10-CM

## 2025-01-02 DIAGNOSIS — O99.213 OBESITY AFFECTING PREGNANCY IN THIRD TRIMESTER, UNSPECIFIED OBESITY TYPE: ICD-10-CM

## 2025-01-02 DIAGNOSIS — D25.9 UTERINE FIBROID DURING PREGNANCY, ANTEPARTUM: ICD-10-CM

## 2025-01-02 DIAGNOSIS — O34.10 UTERINE FIBROID DURING PREGNANCY, ANTEPARTUM: ICD-10-CM

## 2025-01-02 DIAGNOSIS — Z87.59 HISTORY OF OLIGOHYDRAMNIOS: ICD-10-CM

## 2025-01-02 DIAGNOSIS — Z3A.37 37 WEEKS GESTATION OF PREGNANCY: ICD-10-CM

## 2025-01-02 DIAGNOSIS — Z98.891 HISTORY OF CESAREAN SECTION: ICD-10-CM

## 2025-01-02 PROCEDURE — 0501F PRENATAL FLOW SHEET: CPT | Performed by: NURSE PRACTITIONER

## 2025-01-02 NOTE — PATIENT INSTRUCTIONS
PTL/labor precautions, FMC, and pregnancy warning signs reviewed. Pt advised to call the office at [phone] or go straight to Labor and Delivery at [facility] with any of the following concerns vaginal bleeding, leaking of fluid, christophe regularly Q 5-7 minutes for over an hour or not feeling the baby move.     Kick counts and labor precautions reviewed

## 2025-01-02 NOTE — TELEPHONE ENCOUNTER
Pt asking to confirm if her insurance has been filed correctly for future visits. Checked insurance and let pt know it was filed correctly.

## 2025-01-09 ENCOUNTER — ROUTINE PRENATAL (OUTPATIENT)
Dept: OBGYN CLINIC | Age: 35
End: 2025-01-09

## 2025-01-09 ENCOUNTER — PROCEDURE VISIT (OUTPATIENT)
Dept: OBGYN CLINIC | Age: 35
End: 2025-01-09
Payer: COMMERCIAL

## 2025-01-09 VITALS — WEIGHT: 228 LBS | DIASTOLIC BLOOD PRESSURE: 82 MMHG | BODY MASS INDEX: 40.39 KG/M2 | SYSTOLIC BLOOD PRESSURE: 118 MMHG

## 2025-01-09 DIAGNOSIS — Z98.891 HISTORY OF CESAREAN SECTION: ICD-10-CM

## 2025-01-09 DIAGNOSIS — Z87.59 HISTORY OF OLIGOHYDRAMNIOS: ICD-10-CM

## 2025-01-09 DIAGNOSIS — Z91.89 AT RISK FOR CANCER: ICD-10-CM

## 2025-01-09 DIAGNOSIS — O09.93 HIGH-RISK PREGNANCY IN THIRD TRIMESTER: ICD-10-CM

## 2025-01-09 DIAGNOSIS — D25.9 UTERINE FIBROID DURING PREGNANCY, ANTEPARTUM: ICD-10-CM

## 2025-01-09 DIAGNOSIS — O34.10 UTERINE FIBROID DURING PREGNANCY, ANTEPARTUM: ICD-10-CM

## 2025-01-09 DIAGNOSIS — I49.3 PVC (PREMATURE VENTRICULAR CONTRACTION): ICD-10-CM

## 2025-01-09 DIAGNOSIS — Z87.59 HISTORY OF GESTATIONAL HYPERTENSION: ICD-10-CM

## 2025-01-09 DIAGNOSIS — O99.213 OBESITY AFFECTING PREGNANCY IN THIRD TRIMESTER, UNSPECIFIED OBESITY TYPE: Primary | ICD-10-CM

## 2025-01-09 DIAGNOSIS — O09.93 HIGH-RISK PREGNANCY IN THIRD TRIMESTER: Primary | ICD-10-CM

## 2025-01-09 DIAGNOSIS — E53.8 VITAMIN B12 DEFICIENCY: ICD-10-CM

## 2025-01-09 DIAGNOSIS — Z3A.38 38 WEEKS GESTATION OF PREGNANCY: ICD-10-CM

## 2025-01-09 DIAGNOSIS — O99.213 OBESITY AFFECTING PREGNANCY IN THIRD TRIMESTER, UNSPECIFIED OBESITY TYPE: ICD-10-CM

## 2025-01-09 PROCEDURE — 0501F PRENATAL FLOW SHEET: CPT | Performed by: NURSE PRACTITIONER

## 2025-01-09 PROCEDURE — 76816 OB US FOLLOW-UP PER FETUS: CPT | Performed by: OBSTETRICS & GYNECOLOGY

## 2025-01-09 NOTE — PATIENT INSTRUCTIONS
PTL/labor precautions, FMC, and pregnancy warning signs reviewed. Pt advised to call the office at [phone] or go straight to Labor and Delivery at [facility] with any of the following concerns vaginal bleeding, leaking of fluid, christophe regularly Q 5-7 minutes for over an hour or not feeling the baby move.     Kick counts and labor precautions reviewed

## 2025-01-13 ENCOUNTER — TELEPHONE (OUTPATIENT)
Dept: FAMILY MEDICINE CLINIC | Facility: CLINIC | Age: 35
End: 2025-01-13

## 2025-01-13 ENCOUNTER — ROUTINE PRENATAL (OUTPATIENT)
Dept: OBGYN CLINIC | Age: 35
End: 2025-01-13

## 2025-01-13 VITALS — WEIGHT: 229 LBS | DIASTOLIC BLOOD PRESSURE: 82 MMHG | BODY MASS INDEX: 40.57 KG/M2 | SYSTOLIC BLOOD PRESSURE: 126 MMHG

## 2025-01-13 DIAGNOSIS — Z87.59 HISTORY OF OLIGOHYDRAMNIOS: ICD-10-CM

## 2025-01-13 DIAGNOSIS — I49.3 PVC (PREMATURE VENTRICULAR CONTRACTION): Primary | ICD-10-CM

## 2025-01-13 DIAGNOSIS — D25.9 UTERINE FIBROID DURING PREGNANCY, ANTEPARTUM: ICD-10-CM

## 2025-01-13 DIAGNOSIS — Z98.891 HISTORY OF CESAREAN SECTION: ICD-10-CM

## 2025-01-13 DIAGNOSIS — Z87.59 HISTORY OF GESTATIONAL HYPERTENSION: ICD-10-CM

## 2025-01-13 DIAGNOSIS — O99.213 OBESITY AFFECTING PREGNANCY IN THIRD TRIMESTER, UNSPECIFIED OBESITY TYPE: ICD-10-CM

## 2025-01-13 DIAGNOSIS — O09.93 HIGH-RISK PREGNANCY IN THIRD TRIMESTER: ICD-10-CM

## 2025-01-13 DIAGNOSIS — Z91.89 AT RISK FOR CANCER: ICD-10-CM

## 2025-01-13 DIAGNOSIS — E53.8 VITAMIN B12 DEFICIENCY: ICD-10-CM

## 2025-01-13 DIAGNOSIS — O34.10 UTERINE FIBROID DURING PREGNANCY, ANTEPARTUM: ICD-10-CM

## 2025-01-13 PROCEDURE — 0501F PRENATAL FLOW SHEET: CPT | Performed by: OBSTETRICS & GYNECOLOGY

## 2025-01-13 RX ORDER — SODIUM CHLORIDE 0.9 % (FLUSH) 0.9 %
10 SYRINGE (ML) INJECTION PRN
Status: CANCELLED | OUTPATIENT
Start: 2025-01-13

## 2025-01-13 RX ORDER — SODIUM CHLORIDE 0.9 % (FLUSH) 0.9 %
5-40 SYRINGE (ML) INJECTION EVERY 12 HOURS SCHEDULED
Status: CANCELLED | OUTPATIENT
Start: 2025-01-13

## 2025-01-13 RX ORDER — SODIUM CHLORIDE, SODIUM LACTATE, POTASSIUM CHLORIDE, AND CALCIUM CHLORIDE .6; .31; .03; .02 G/100ML; G/100ML; G/100ML; G/100ML
1000 INJECTION, SOLUTION INTRAVENOUS ONCE
Status: CANCELLED | OUTPATIENT
Start: 2025-01-13 | End: 2025-01-13

## 2025-01-13 RX ORDER — ACETAMINOPHEN 325 MG/1
975 TABLET ORAL ONCE
Status: CANCELLED | OUTPATIENT
Start: 2025-01-13 | End: 2025-01-13

## 2025-01-13 RX ORDER — SODIUM CHLORIDE 9 MG/ML
INJECTION, SOLUTION INTRAVENOUS PRN
Status: CANCELLED | OUTPATIENT
Start: 2025-01-13

## 2025-01-13 RX ORDER — ONDANSETRON 2 MG/ML
4 INJECTION INTRAMUSCULAR; INTRAVENOUS EVERY 6 HOURS PRN
Status: CANCELLED | OUTPATIENT
Start: 2025-01-13

## 2025-01-13 RX ORDER — SODIUM CHLORIDE, SODIUM LACTATE, POTASSIUM CHLORIDE, CALCIUM CHLORIDE 600; 310; 30; 20 MG/100ML; MG/100ML; MG/100ML; MG/100ML
INJECTION, SOLUTION INTRAVENOUS CONTINUOUS
Status: CANCELLED | OUTPATIENT
Start: 2025-01-13

## 2025-01-13 RX ORDER — FAMOTIDINE 10 MG/ML
20 INJECTION, SOLUTION INTRAVENOUS ONCE
Status: CANCELLED | OUTPATIENT
Start: 2025-01-13 | End: 2025-01-13

## 2025-01-13 NOTE — PROGRESS NOTES
LOAN. .  38w6d   Denies LOF, VB, Ctxs.  Good FM.      Vitals:    25 1019   BP: 126/82        General: well appearing NAD  Psych: normal affect  Abdomen: gravid   LE: no edema    History of  section  RCS with RR salpingectomy scheduled for Thursday with me   Known 6 cm fundal intramural fibroid     High-risk pregnancy in third trimester  Kick counts and labor precautions reviewed       Ena Villavicencio DO

## 2025-01-13 NOTE — H&P
CS Admission Note    Tana [facility]  [id number]  Estimated Date of Delivery: 25     OB History          2    Para   1    Term   1       0    AB   0    Living   1         SAB        IAB        Ectopic        Molar        Multiple        Live Births   1                Patient admitted with pregnancy at 39w2d for repeat CS due to desire for repeat. Patient also at risk for cancer and elects for risk reducing salpingectomy at time of CS. Pregnancy complicated by maternal obesity. Currently BMI of 40.     Pregravid BMI > 35 = Risk Factor   Approval received 12/10      Current Outpatient Medications   Medication Sig Dispense Refill    albuterol sulfate HFA (VENTOLIN HFA) 108 (90 Base) MCG/ACT inhaler Inhale 2 puffs into the lungs 4 times daily as needed for Wheezing 18 g 0    B Complex Vitamins (B COMPLEX-B12 PO) Take by mouth      Prenatal Vit-Fe Fumarate-FA (PRENATAL VITAMIN) 27-0.8 MG TABS Take by mouth      cyanocobalamin 1000 MCG/ML injection Inject 1 mL into the muscle every 14 days Do 1 cc IM every 14 days for 2 months then go to every 28 days. 6 mL 3     No current facility-administered medications for this visit.        EXAM: Cervical Exam:  deferred                Fetal Heart Rate: TBD on admission        Labs:   Lab Results   Component Value Date/Time    ABORH A POSITIVE 2024 11:59 AM          Plan: Admit for RCS with RR salpingectomy    C/S Counseling:  Pt counseled on R/B/A to c/s including, but not limited to, bleeding, infxn, damage to nearby structures (most commonly bladder, ureters, bowel), possible cut to the baby, possble need for blood transfusion with associated risk of possible transmission of Hep C/HIV, possible need for life saving C-hyst.  All questions answered.      Ena Villavicencio,   2025   10:19 AM

## 2025-01-13 NOTE — TELEPHONE ENCOUNTER
Pt asked if office could check to make sure her insurance has been filed correctly. Checked and fixed mistake on 01/2/25 visit.

## 2025-01-14 ENCOUNTER — CARE COORDINATION (OUTPATIENT)
Dept: OTHER | Facility: CLINIC | Age: 35
End: 2025-01-14

## 2025-01-14 NOTE — CARE COORDINATION
Goal progress and follow-up appointment scheduling  Summary Note: Patient reports that she is doing well. She denies any red-flag s/s or needs r/t DME, medications or appointments. She is now 39.0 weeks pregnant, due on 1/16/25 via repeat c/section. Patient's pregnancy is complicated by Obesity, PVC's, Hx: Oligohydramnios and Hx: c/section. Patient states that she does not have new concerns or changes in her medications or history. She did participate in [facility] and has completed the program at this time. She denies any needs currently and is appreciative of the follow-up call. Social determinants of health impacting care: None. Shared Decision Making completed with patient regarding benefits of CM. She is agreeable to a follow-up call with ACM in 2 weeks and will call with any needs in the meantime.       PHILIP AvitiaN, RN  Associate Care Manager   Cell: [phone]  [email or web address]

## 2025-01-16 ENCOUNTER — ANESTHESIA (OUTPATIENT)
Dept: OPERATING ROOM | Age: 35
End: 2025-01-16
Payer: COMMERCIAL

## 2025-01-16 ENCOUNTER — HOSPITAL ENCOUNTER (INPATIENT)
Age: 35
LOS: 2 days | Discharge: HOME | End: 2025-01-18
Attending: OBSTETRICS & GYNECOLOGY | Admitting: OBSTETRICS & GYNECOLOGY
Payer: COMMERCIAL

## 2025-01-16 ENCOUNTER — ANESTHESIA EVENT (OUTPATIENT)
Dept: OPERATING ROOM | Age: 35
End: 2025-01-16
Payer: COMMERCIAL

## 2025-01-16 ENCOUNTER — APPOINTMENT (OUTPATIENT)
Dept: LABOR AND DELIVERY | Age: 35
End: 2025-01-16
Payer: COMMERCIAL

## 2025-01-16 DIAGNOSIS — Z98.891 HISTORY OF CESAREAN DELIVERY: Primary | ICD-10-CM

## 2025-01-16 LAB
ABO + RH BLD: NORMAL
BLOOD GROUP ANTIBODIES SERPL: NORMAL
ERYTHROCYTE [DISTWIDTH] IN BLOOD BY AUTOMATED COUNT: 12.9 % (ref 11.9–14.6)
HCT VFR BLD AUTO: 36.4 % (ref 35.8–46.3)
HGB BLD-MCNC: 12.4 G/DL (ref 11.7–15.4)
MCH RBC QN AUTO: 29.8 PG (ref 26.1–32.9)
MCHC RBC AUTO-ENTMCNC: 34.1 G/DL (ref 31.4–35)
MCV RBC AUTO: 87.5 FL (ref 82–102)
NRBC # BLD: 0 K/UL (ref 0–0.2)
PLATELET # BLD AUTO: 346 K/UL (ref 150–450)
PMV BLD AUTO: 10.9 FL (ref 9.4–12.3)
RBC # BLD AUTO: 4.16 M/UL (ref 4.05–5.2)
SPECIMEN EXP DATE BLD: NORMAL
T PALLIDUM AB SER QL IA: NONREACTIVE
WBC # BLD AUTO: 10.2 K/UL (ref 4.3–11.1)

## 2025-01-16 PROCEDURE — 2580000003 HC RX 258: Performed by: OBSTETRICS & GYNECOLOGY

## 2025-01-16 PROCEDURE — 0UT70ZZ RESECTION OF BILATERAL FALLOPIAN TUBES, OPEN APPROACH: ICD-10-PCS | Performed by: OBSTETRICS & GYNECOLOGY

## 2025-01-16 PROCEDURE — 59515 CESAREAN DELIVERY: CPT | Performed by: OBSTETRICS & GYNECOLOGY

## 2025-01-16 PROCEDURE — 1100000000 HC RM PRIVATE

## 2025-01-16 PROCEDURE — 6360000002 HC RX W HCPCS: Performed by: OBSTETRICS & GYNECOLOGY

## 2025-01-16 PROCEDURE — 3700000001 HC ADD 15 MINUTES (ANESTHESIA): Performed by: OBSTETRICS & GYNECOLOGY

## 2025-01-16 PROCEDURE — 6360000002 HC RX W HCPCS: Performed by: ANESTHESIOLOGY

## 2025-01-16 PROCEDURE — 86850 RBC ANTIBODY SCREEN: CPT

## 2025-01-16 PROCEDURE — 2500000003 HC RX 250 WO HCPCS: Performed by: OBSTETRICS & GYNECOLOGY

## 2025-01-16 PROCEDURE — 6370000000 HC RX 637 (ALT 250 FOR IP): Performed by: OBSTETRICS & GYNECOLOGY

## 2025-01-16 PROCEDURE — 58700 REMOVAL OF FALLOPIAN TUBE: CPT | Performed by: OBSTETRICS & GYNECOLOGY

## 2025-01-16 PROCEDURE — 85027 COMPLETE CBC AUTOMATED: CPT

## 2025-01-16 PROCEDURE — 86780 TREPONEMA PALLIDUM: CPT

## 2025-01-16 PROCEDURE — 3700000000 HC ANESTHESIA ATTENDED CARE: Performed by: OBSTETRICS & GYNECOLOGY

## 2025-01-16 PROCEDURE — 88305 TISSUE EXAM BY PATHOLOGIST: CPT

## 2025-01-16 PROCEDURE — 2709999900 HC NON-CHARGEABLE SUPPLY: Performed by: OBSTETRICS & GYNECOLOGY

## 2025-01-16 PROCEDURE — 3600000014 HC SURGERY LEVEL 4 ADDTL 15MIN: Performed by: OBSTETRICS & GYNECOLOGY

## 2025-01-16 PROCEDURE — 7100000001 HC PACU RECOVERY - ADDTL 15 MIN: Performed by: OBSTETRICS & GYNECOLOGY

## 2025-01-16 PROCEDURE — 59025 FETAL NON-STRESS TEST: CPT

## 2025-01-16 PROCEDURE — 7100000000 HC PACU RECOVERY - FIRST 15 MIN: Performed by: OBSTETRICS & GYNECOLOGY

## 2025-01-16 PROCEDURE — 2580000003 HC RX 258: Performed by: ANESTHESIOLOGY

## 2025-01-16 PROCEDURE — 2500000003 HC RX 250 WO HCPCS

## 2025-01-16 PROCEDURE — 6370000000 HC RX 637 (ALT 250 FOR IP): Performed by: ANESTHESIOLOGY

## 2025-01-16 PROCEDURE — 86901 BLOOD TYPING SEROLOGIC RH(D): CPT

## 2025-01-16 PROCEDURE — 3600000004 HC SURGERY LEVEL 4 BASE: Performed by: OBSTETRICS & GYNECOLOGY

## 2025-01-16 PROCEDURE — 2500000003 HC RX 250 WO HCPCS: Performed by: ANESTHESIOLOGY

## 2025-01-16 PROCEDURE — 2720000010 HC SURG SUPPLY STERILE: Performed by: OBSTETRICS & GYNECOLOGY

## 2025-01-16 PROCEDURE — 86900 BLOOD TYPING SEROLOGIC ABO: CPT

## 2025-01-16 PROCEDURE — 6360000002 HC RX W HCPCS

## 2025-01-16 RX ORDER — ACETAMINOPHEN 325 MG/1
650 TABLET ORAL EVERY 6 HOURS
Status: COMPLETED | OUTPATIENT
Start: 2025-01-16 | End: 2025-01-17

## 2025-01-16 RX ORDER — SODIUM CHLORIDE, SODIUM LACTATE, POTASSIUM CHLORIDE, AND CALCIUM CHLORIDE .6; .31; .03; .02 G/100ML; G/100ML; G/100ML; G/100ML
1000 INJECTION, SOLUTION INTRAVENOUS ONCE
Status: COMPLETED | OUTPATIENT
Start: 2025-01-16 | End: 2025-01-16

## 2025-01-16 RX ORDER — ONDANSETRON 2 MG/ML
4 INJECTION INTRAMUSCULAR; INTRAVENOUS EVERY 6 HOURS PRN
Status: DISCONTINUED | OUTPATIENT
Start: 2025-01-17 | End: 2025-01-18 | Stop reason: HOSPADM

## 2025-01-16 RX ORDER — SODIUM CHLORIDE 9 MG/ML
INJECTION, SOLUTION INTRAVENOUS PRN
Status: DISCONTINUED | OUTPATIENT
Start: 2025-01-16 | End: 2025-01-16 | Stop reason: SDUPTHER

## 2025-01-16 RX ORDER — SODIUM CHLORIDE 0.9 % (FLUSH) 0.9 %
10 SYRINGE (ML) INJECTION PRN
Status: DISCONTINUED | OUTPATIENT
Start: 2025-01-16 | End: 2025-01-16 | Stop reason: SDUPTHER

## 2025-01-16 RX ORDER — SODIUM CHLORIDE 9 MG/ML
INJECTION, SOLUTION INTRAVENOUS PRN
Status: DISCONTINUED | OUTPATIENT
Start: 2025-01-16 | End: 2025-01-18 | Stop reason: HOSPADM

## 2025-01-16 RX ORDER — CITRIC ACID/SODIUM CITRATE 334-500MG
30 SOLUTION, ORAL ORAL ONCE
Status: COMPLETED | OUTPATIENT
Start: 2025-01-16 | End: 2025-01-16

## 2025-01-16 RX ORDER — ONDANSETRON 2 MG/ML
4 INJECTION INTRAMUSCULAR; INTRAVENOUS EVERY 6 HOURS PRN
Status: DISCONTINUED | OUTPATIENT
Start: 2025-01-16 | End: 2025-01-16

## 2025-01-16 RX ORDER — OXYCODONE HYDROCHLORIDE 5 MG/1
10 TABLET ORAL EVERY 4 HOURS PRN
Status: DISCONTINUED | OUTPATIENT
Start: 2025-01-16 | End: 2025-01-18 | Stop reason: HOSPADM

## 2025-01-16 RX ORDER — IBUPROFEN 800 MG/1
800 TABLET, FILM COATED ORAL EVERY 8 HOURS
Status: DISCONTINUED | OUTPATIENT
Start: 2025-01-18 | End: 2025-01-17

## 2025-01-16 RX ORDER — KETOROLAC TROMETHAMINE 30 MG/ML
30 INJECTION, SOLUTION INTRAMUSCULAR; INTRAVENOUS EVERY 6 HOURS
Status: DISCONTINUED | OUTPATIENT
Start: 2025-01-17 | End: 2025-01-17

## 2025-01-16 RX ORDER — PANTOPRAZOLE SODIUM 40 MG/1
40 TABLET, DELAYED RELEASE ORAL DAILY PRN
Status: DISCONTINUED | OUTPATIENT
Start: 2025-01-16 | End: 2025-01-18 | Stop reason: HOSPADM

## 2025-01-16 RX ORDER — ALBUTEROL SULFATE 90 UG/1
2 INHALANT RESPIRATORY (INHALATION) 4 TIMES DAILY PRN
Status: DISCONTINUED | OUTPATIENT
Start: 2025-01-16 | End: 2025-01-16

## 2025-01-16 RX ORDER — ONDANSETRON 2 MG/ML
4 INJECTION INTRAMUSCULAR; INTRAVENOUS ONCE
Status: COMPLETED | OUTPATIENT
Start: 2025-01-16 | End: 2025-01-16

## 2025-01-16 RX ORDER — SIMETHICONE 80 MG
80 TABLET,CHEWABLE ORAL EVERY 6 HOURS PRN
Status: DISCONTINUED | OUTPATIENT
Start: 2025-01-16 | End: 2025-01-18 | Stop reason: HOSPADM

## 2025-01-16 RX ORDER — EPHEDRINE SULFATE 5 MG/ML
INJECTION INTRAVENOUS
Status: DISCONTINUED | OUTPATIENT
Start: 2025-01-16 | End: 2025-01-16 | Stop reason: SDUPTHER

## 2025-01-16 RX ORDER — SODIUM CHLORIDE, SODIUM LACTATE, POTASSIUM CHLORIDE, CALCIUM CHLORIDE 600; 310; 30; 20 MG/100ML; MG/100ML; MG/100ML; MG/100ML
INJECTION, SOLUTION INTRAVENOUS CONTINUOUS
Status: DISCONTINUED | OUTPATIENT
Start: 2025-01-16 | End: 2025-01-16 | Stop reason: SDUPTHER

## 2025-01-16 RX ORDER — NALBUPHINE HYDROCHLORIDE 10 MG/ML
5 INJECTION INTRAMUSCULAR; INTRAVENOUS; SUBCUTANEOUS EVERY 4 HOURS PRN
Status: DISCONTINUED | OUTPATIENT
Start: 2025-01-16 | End: 2025-01-17

## 2025-01-16 RX ORDER — KETOROLAC TROMETHAMINE 30 MG/ML
30 INJECTION, SOLUTION INTRAMUSCULAR; INTRAVENOUS EVERY 6 HOURS
Status: DISCONTINUED | OUTPATIENT
Start: 2025-01-16 | End: 2025-01-17

## 2025-01-16 RX ORDER — BUPIVACAINE HYDROCHLORIDE 7.5 MG/ML
INJECTION, SOLUTION INTRASPINAL
Status: COMPLETED | OUTPATIENT
Start: 2025-01-16 | End: 2025-01-16

## 2025-01-16 RX ORDER — SODIUM CHLORIDE 0.9 % (FLUSH) 0.9 %
5-40 SYRINGE (ML) INJECTION EVERY 12 HOURS SCHEDULED
Status: DISCONTINUED | OUTPATIENT
Start: 2025-01-16 | End: 2025-01-16 | Stop reason: SDUPTHER

## 2025-01-16 RX ORDER — OXYCODONE HYDROCHLORIDE 5 MG/1
5 TABLET ORAL EVERY 6 HOURS PRN
Status: DISCONTINUED | OUTPATIENT
Start: 2025-01-16 | End: 2025-01-17

## 2025-01-16 RX ORDER — NALOXONE HYDROCHLORIDE 0.4 MG/ML
INJECTION, SOLUTION INTRAMUSCULAR; INTRAVENOUS; SUBCUTANEOUS PRN
Status: DISCONTINUED | OUTPATIENT
Start: 2025-01-16 | End: 2025-01-17

## 2025-01-16 RX ORDER — DOCUSATE SODIUM 100 MG/1
100 CAPSULE, LIQUID FILLED ORAL 2 TIMES DAILY
Status: DISCONTINUED | OUTPATIENT
Start: 2025-01-16 | End: 2025-01-18 | Stop reason: HOSPADM

## 2025-01-16 RX ORDER — KETOROLAC TROMETHAMINE 30 MG/ML
INJECTION, SOLUTION INTRAMUSCULAR; INTRAVENOUS
Status: DISCONTINUED | OUTPATIENT
Start: 2025-01-16 | End: 2025-01-16 | Stop reason: SDUPTHER

## 2025-01-16 RX ORDER — LIDOCAINE HYDROCHLORIDE 10 MG/ML
1 INJECTION, SOLUTION INFILTRATION; PERINEURAL
Status: DISCONTINUED | OUTPATIENT
Start: 2025-01-16 | End: 2025-01-16

## 2025-01-16 RX ORDER — SODIUM CHLORIDE 0.9 % (FLUSH) 0.9 %
5-40 SYRINGE (ML) INJECTION EVERY 12 HOURS SCHEDULED
Status: DISCONTINUED | OUTPATIENT
Start: 2025-01-16 | End: 2025-01-17

## 2025-01-16 RX ORDER — DIPHENHYDRAMINE HYDROCHLORIDE 50 MG/ML
INJECTION INTRAMUSCULAR; INTRAVENOUS
Status: DISCONTINUED | OUTPATIENT
Start: 2025-01-16 | End: 2025-01-16 | Stop reason: SDUPTHER

## 2025-01-16 RX ORDER — SODIUM CHLORIDE, SODIUM LACTATE, POTASSIUM CHLORIDE, CALCIUM CHLORIDE 600; 310; 30; 20 MG/100ML; MG/100ML; MG/100ML; MG/100ML
INJECTION, SOLUTION INTRAVENOUS CONTINUOUS
Status: DISCONTINUED | OUTPATIENT
Start: 2025-01-16 | End: 2025-01-16

## 2025-01-16 RX ORDER — SODIUM CHLORIDE 0.9 % (FLUSH) 0.9 %
5-40 SYRINGE (ML) INJECTION PRN
Status: DISCONTINUED | OUTPATIENT
Start: 2025-01-16 | End: 2025-01-18 | Stop reason: HOSPADM

## 2025-01-16 RX ORDER — DIPHENHYDRAMINE HCL 25 MG
25 CAPSULE ORAL EVERY 6 HOURS PRN
Status: DISCONTINUED | OUTPATIENT
Start: 2025-01-17 | End: 2025-01-18 | Stop reason: HOSPADM

## 2025-01-16 RX ORDER — HYDROMORPHONE HYDROCHLORIDE 1 MG/ML
0.5 INJECTION, SOLUTION INTRAMUSCULAR; INTRAVENOUS; SUBCUTANEOUS EVERY 4 HOURS PRN
Status: DISCONTINUED | OUTPATIENT
Start: 2025-01-16 | End: 2025-01-17

## 2025-01-16 RX ORDER — SODIUM CHLORIDE 0.9 % (FLUSH) 0.9 %
5-40 SYRINGE (ML) INJECTION PRN
Status: DISCONTINUED | OUTPATIENT
Start: 2025-01-16 | End: 2025-01-16

## 2025-01-16 RX ORDER — LANOLIN
CREAM (ML) TOPICAL
Status: DISCONTINUED | OUTPATIENT
Start: 2025-01-16 | End: 2025-01-18 | Stop reason: HOSPADM

## 2025-01-16 RX ORDER — MORPHINE SULFATE 0.5 MG/ML
INJECTION, SOLUTION EPIDURAL; INTRATHECAL; INTRAVENOUS
Status: COMPLETED | OUTPATIENT
Start: 2025-01-16 | End: 2025-01-16

## 2025-01-16 RX ORDER — ACETAMINOPHEN 325 MG/1
975 TABLET ORAL ONCE
Status: COMPLETED | OUTPATIENT
Start: 2025-01-16 | End: 2025-01-16

## 2025-01-16 RX ORDER — SODIUM CHLORIDE 0.9 % (FLUSH) 0.9 %
5-40 SYRINGE (ML) INJECTION EVERY 12 HOURS SCHEDULED
Status: DISCONTINUED | OUTPATIENT
Start: 2025-01-16 | End: 2025-01-16

## 2025-01-16 RX ORDER — SODIUM CHLORIDE 9 MG/ML
INJECTION, SOLUTION INTRAVENOUS PRN
Status: DISCONTINUED | OUTPATIENT
Start: 2025-01-16 | End: 2025-01-16

## 2025-01-16 RX ORDER — SODIUM CHLORIDE, SODIUM LACTATE, POTASSIUM CHLORIDE, CALCIUM CHLORIDE 600; 310; 30; 20 MG/100ML; MG/100ML; MG/100ML; MG/100ML
INJECTION, SOLUTION INTRAVENOUS CONTINUOUS
Status: DISCONTINUED | OUTPATIENT
Start: 2025-01-16 | End: 2025-01-17

## 2025-01-16 RX ORDER — ACETAMINOPHEN 325 MG/1
650 TABLET ORAL EVERY 4 HOURS
Status: DISCONTINUED | OUTPATIENT
Start: 2025-01-17 | End: 2025-01-17

## 2025-01-16 RX ORDER — ONDANSETRON 4 MG/1
4 TABLET, ORALLY DISINTEGRATING ORAL EVERY 8 HOURS PRN
Status: DISCONTINUED | OUTPATIENT
Start: 2025-01-17 | End: 2025-01-18 | Stop reason: HOSPADM

## 2025-01-16 RX ADMIN — ACETAMINOPHEN 975 MG: 325 TABLET, FILM COATED ORAL at 09:25

## 2025-01-16 RX ADMIN — PHENYLEPHRINE HYDROCHLORIDE 100 MCG: 0.1 INJECTION, SOLUTION INTRAVENOUS at 10:10

## 2025-01-16 RX ADMIN — ACETAMINOPHEN 650 MG: 325 TABLET, FILM COATED ORAL at 21:52

## 2025-01-16 RX ADMIN — KETOROLAC TROMETHAMINE 30 MG: 30 INJECTION, SOLUTION INTRAMUSCULAR; INTRAVENOUS at 10:56

## 2025-01-16 RX ADMIN — EPHEDRINE SULFATE 10 MG: 5 INJECTION INTRAVENOUS at 10:06

## 2025-01-16 RX ADMIN — EPHEDRINE SULFATE 10 MG: 5 INJECTION INTRAVENOUS at 10:21

## 2025-01-16 RX ADMIN — SODIUM CHLORIDE, SODIUM LACTATE, POTASSIUM CHLORIDE, AND CALCIUM CHLORIDE: 600; 310; 30; 20 INJECTION, SOLUTION INTRAVENOUS at 09:56

## 2025-01-16 RX ADMIN — Medication 500 ML/HR: at 10:27

## 2025-01-16 RX ADMIN — KETOROLAC TROMETHAMINE 30 MG: 30 INJECTION, SOLUTION INTRAMUSCULAR at 18:01

## 2025-01-16 RX ADMIN — EPHEDRINE SULFATE 10 MG: 5 INJECTION INTRAVENOUS at 10:22

## 2025-01-16 RX ADMIN — SODIUM CITRATE AND CITRIC ACID MONOHYDRATE 30 ML: 334; 500 SOLUTION ORAL at 09:25

## 2025-01-16 RX ADMIN — EPHEDRINE SULFATE 10 MG: 5 INJECTION INTRAVENOUS at 10:41

## 2025-01-16 RX ADMIN — ONDANSETRON 4 MG: 2 INJECTION INTRAMUSCULAR; INTRAVENOUS at 09:21

## 2025-01-16 RX ADMIN — BUPIVACAINE HYDROCHLORIDE IN DEXTROSE 10.5 MG: 7.5 INJECTION, SOLUTION SUBARACHNOID at 10:02

## 2025-01-16 RX ADMIN — PHENYLEPHRINE HYDROCHLORIDE 100 MCG: 0.1 INJECTION, SOLUTION INTRAVENOUS at 10:28

## 2025-01-16 RX ADMIN — FAMOTIDINE 20 MG: 10 INJECTION, SOLUTION INTRAVENOUS at 09:24

## 2025-01-16 RX ADMIN — SODIUM CHLORIDE, POTASSIUM CHLORIDE, SODIUM LACTATE AND CALCIUM CHLORIDE 1000 ML: 600; 310; 30; 20 INJECTION, SOLUTION INTRAVENOUS at 08:31

## 2025-01-16 RX ADMIN — MORPHINE SULFATE 0.15 MG: 0.5 INJECTION, SOLUTION EPIDURAL; INTRATHECAL; INTRAVENOUS at 10:02

## 2025-01-16 RX ADMIN — ACETAMINOPHEN 650 MG: 325 TABLET, FILM COATED ORAL at 15:27

## 2025-01-16 RX ADMIN — DIPHENHYDRAMINE HYDROCHLORIDE 12.5 MG: 50 INJECTION, SOLUTION INTRAMUSCULAR; INTRAVENOUS at 10:36

## 2025-01-16 RX ADMIN — OXYCODONE 10 MG: 5 TABLET ORAL at 12:48

## 2025-01-16 RX ADMIN — CEFAZOLIN 2000 MG: 2 INJECTION, POWDER, FOR SOLUTION INTRAMUSCULAR; INTRAVENOUS at 09:25

## 2025-01-16 RX ADMIN — EPHEDRINE SULFATE 10 MG: 5 INJECTION INTRAVENOUS at 10:10

## 2025-01-16 RX ADMIN — SODIUM CHLORIDE, PRESERVATIVE FREE 20 ML: 5 INJECTION INTRAVENOUS at 18:02

## 2025-01-16 RX ADMIN — SODIUM CHLORIDE, POTASSIUM CHLORIDE, SODIUM LACTATE AND CALCIUM CHLORIDE: 600; 310; 30; 20 INJECTION, SOLUTION INTRAVENOUS at 11:35

## 2025-01-16 RX ADMIN — DOCUSATE SODIUM 100 MG: 100 CAPSULE, LIQUID FILLED ORAL at 21:54

## 2025-01-16 RX ADMIN — SODIUM CHLORIDE, SODIUM LACTATE, POTASSIUM CHLORIDE, AND CALCIUM CHLORIDE: 600; 310; 30; 20 INJECTION, SOLUTION INTRAVENOUS at 11:00

## 2025-01-16 RX ADMIN — PHENYLEPHRINE HYDROCHLORIDE 50 MCG: 0.1 INJECTION, SOLUTION INTRAVENOUS at 10:41

## 2025-01-16 RX ADMIN — ONDANSETRON 4 MG: 2 INJECTION INTRAMUSCULAR; INTRAVENOUS at 10:11

## 2025-01-16 RX ADMIN — SODIUM CHLORIDE, SODIUM LACTATE, POTASSIUM CHLORIDE, AND CALCIUM CHLORIDE: 600; 310; 30; 20 INJECTION, SOLUTION INTRAVENOUS at 09:20

## 2025-01-16 ASSESSMENT — PAIN SCALES - GENERAL: PAINLEVEL_OUTOF10: 8

## 2025-01-16 ASSESSMENT — PAIN DESCRIPTION - LOCATION: LOCATION: ABDOMEN

## 2025-01-16 ASSESSMENT — PAIN DESCRIPTION - DESCRIPTORS: DESCRIPTORS: BURNING;CRAMPING

## 2025-01-16 NOTE — ANESTHESIA POSTPROCEDURE EVALUATION
Department of Anesthesiology  Postprocedure Note    Patient: Tana Healy  MRN: [id number]  YOB: 1990  Date of evaluation: 2025    Procedure Summary       Date: 25 Room / Location: [facility] L&D OR  [facility] L&D    Anesthesia Start: 955 Anesthesia Stop:     Procedure:  SECTION BILATERAL SALPINGECTOMY (Bilateral) Diagnosis:       Hx of  section      (Hx of  section [Z98.891])    Surgeons: Ena Villavicencio DO Responsible Provider: Alvin Holly MD    Anesthesia Type: spinal ASA Status: 2            Anesthesia Type: No value filed.    Haylee Phase I: Haylee Score: 9    Haylee Phase II:      Anesthesia Post Evaluation    Patient location during evaluation: floor  Patient participation: complete - patient participated  Level of consciousness: awake and alert  Airway patency: patent  Nausea & Vomiting: no nausea and no vomiting  Cardiovascular status: hemodynamically stable  Respiratory status: acceptable  Hydration status: stable  Pain management: satisfactory to patient    No notable events documented.

## 2025-01-16 NOTE — OP NOTE
Section Delivery Operative Report       Patient: Tana Healy MRN: [id number]  SSN: [id number]    YOB: 1990  Age: 34 y.o.  Sex: female       Date of Procedure: 25      Preoperative Diagnosis: Hx of  section [Z98.891]    Postoperative Diagnosis: * No post-op diagnosis entered *    Procedure:   Repeat LTCS  Risk reducing bilateral salpingectomy     Surgeon(s):  Ena Villavicencio DO    Indication: Desire for repeat CS    Anesthesia: Spinal    Prophylactic Antibiotics: Ancef    QBL: Pending, but very appropriate EBL         Information for the patient's :  Neal Healy [[id number]]   @[id number]@     Specimens:   ID Type Source Tests Collected by Time Destination   A : Bilateral Fallopian Tubes Tissue Fallopian Tube SURGICAL PATHOLOGY Ena Villavicencio DO 2025 1039                Complications:  none    Findings: Normal appearing uterus, bilateral tubes and ovaries. Adhesions of bladder to JACKIE. Intramural fundal fibroid present that was barely visible      Procedure Details:    Patient was taken to the OR after informed consent had been obtained. After anesthesia was found to be adequate, she was then placed in a dorsal supine position with a left lateral tilt. She was then prepped and draped in a sterile fashion. Time out was completed.      Attention was turned to the abdomen and an Allis test confirmed adequate anesthesia.  A Pfannenstiel skin incision was made with the scalpel down to the fascia.  The fascia was knicked in the midline.  The incision was extended laterally using mcbride scissors.  The cephalad fascia was grasped with kochers and the rectus muscles  off both bluntly and sharply.  Rectus muscles were  bluntly.  The peritoneum was entered bluntly and the incision was extended laterally bluntly.  The bladder blade was inserted.   A low transverse incision was made on the uterus in the midline.  The uterine incision was extended

## 2025-01-16 NOTE — CONSULTS
Clinical Ethics Consultation Note    ERD Advisement    Consulted by Dr. Villavicencio for this 33 yo female who has requested a salpingectomy to reduce the risk of cancer.? The patient will have . The patient has increased risk for breast, ovarian, and/or uterine cancer due to obesity. The patient has been counseled regarding irreversible infertility. This procedure is consistent with the ERDs, as there is a strong likelihood of existing pathology in her fallopian tubes, and the procedure is morally justified.? For questions or concerns regarding this consultation, please call Shashi Castaneda at [phone] or via [employer].

## 2025-01-16 NOTE — PROGRESS NOTES
Pt  called RN to room due to c/o feeling some vaginal bleeding. Fundus firm/ midline/ -1 scant blood noted on peripad.  Christine care performed and peripad changed. Pt in bed with call light in reach.

## 2025-01-16 NOTE — PROGRESS NOTES
C/s scheduled at 39.2 weeks  Baby boy  [place] p[facility]  Breastfeeding    Weight 3610g ( 7lbs 15 lbs)  Length of 54 cms / 21.25in)  Void x1  No stool at delivery    ATTN PEDS: large anterior fontanelle / bulging / soft  Apgars 7/7  Chi and RT present for delivery- see their note.   At 4 min- blow by o2  At 5 min- deep suction, 74% o2 sat  At 6 min cpap

## 2025-01-16 NOTE — L&D DELIVERY NOTE
Neal Healy [[id number]]      Labor Events     Labor: No   Steroids: None  Cervical Ripening Date/Time:      Rupture Identifier: Sac 1  Rupture Date/Time:  25 10:25:46   Rupture Type: AROM  Fluid Color: Clear  Fluid Odor: None  Fluid Volume: Moderate  Labor Complications: None       Anesthesia    Method: Spinal       Labor Length    3rd stage: 0h 03m       Delivery Details      Delivery Date: 25 Delivery Time: 10:26:21   Delivery Type: , Low Transverse  Trial of Labor?: No   Categorization: Repeat   Priority: scheduled  Indications for : Prior Uterine Surgery       Skin Incision Type: Pfannenstiel  Uterine Incision: Low Transverse       Grawn Presentation    Presentation: Vertex       Shoulder Dystocia    Shoulder Dystocia Present?: No       Assisted Delivery Details    Forceps Attempted?: No  Vacuum Extractor Attempted?: No                           Cord    Vessels: 3 Vessels  Complications: Nuchal Loose  Cord Around: Shoulders  Delayed Cord Clamping?: Yes  Cord Clamped Date/Time: 2025 10:26:00  Cord Blood Disposition: Lab  Gases Sent?: No              Placenta    Date/Time: 2025 10:29:21  Removal: Expressed  Appearance: Intact  Disposition: Discarded       Lacerations           Vaginal Counts    Initial Count Personnel: NA  Final Count Personnel: NA       Blood Loss  Mother: Tana Healy #[id number]     Start of Mother's Information      Delivery Blood Loss   Intrapartum & Postpartum: 01/15/25 2226 - 25 1059    Delivery Admission: 01/15/25 2226 - 01/16/25 1059         Intrapartum & Postpartum Delivery Admission    Quantitative Blood Loss (mL) Hospital Encounter 270 grams 270 grams    Total  270 mL 270 mL               End of Mother's Information  Mother: Tana Healy #[id number]                Delivery Providers    Delivering clinician: Ena Villavicencio DO     Provider Role    Ena Villavicencio, DO Obstetrician    Lokesh

## 2025-01-16 NOTE — PROGRESS NOTES
C/s scheduled at 39.2 weeks  Baby boy  [place] p[facility]  Breastfeeding    Weight 3610g ( 7lbs 15 lbs)  Length of 54 cms / 21.25in)  Void x1  No stool at delivery    ATTN PEDS: large anterior fontanelle / bulging / soft  Apgars 7/7  Chi and RT present for delivery- see their note.   At 4 min- blow by o2  At 5 min- deep suction, 74% o2 sat  At 6 min cpap

## 2025-01-16 NOTE — PROGRESS NOTES
C/s scheduled at 39.2 weeks  Baby boy  [place] peds  Breastfeeding    Weight 3610g ( 7lbs 15 lbs)  Length of 54 cms / 21.25in)  Void x1  No stool at delivery    ATTN PEDS: large anterior fontanelle / bulging / soft  Apgars 7/7  Chi and RT present for delivery- see their note.   At 4 min- blow by o2  At 5 min- deep suction, 74% o2 sat  At 6 min cpap

## 2025-01-16 NOTE — ANESTHESIA PRE PROCEDURE
Department of Anesthesiology  Preprocedure Note       Name:  Tana Healy   Age:  34 y.o.  :  1990                                          MRN:  [id number]         Date:  2025      Surgeon: Surgeon(s):  Ena Villavicencio DO    Procedure: Procedure(s):   SECTION BILATERAL SALPINGECTOMY    Medications prior to admission:   Prior to Admission medications    Medication Sig Start Date End Date Taking? Authorizing Provider   albuterol sulfate HFA (VENTOLIN HFA) 108 (90 Base) MCG/ACT inhaler Inhale 2 puffs into the lungs 4 times daily as needed for Wheezing 24   Keith More MD   B Complex Vitamins (B COMPLEX-B12 PO) Take by mouth    Provider, MD Krystian   Prenatal Vit-Fe Fumarate-FA (PRENATAL VITAMIN) 27-0.8 MG TABS Take by mouth    Provider, MD Krystian   cyanocobalamin 1000 MCG/ML injection Inject 1 mL into the muscle every 14 days Do 1 cc IM every 14 days for 2 months then go to every 28 days. 3/21/24   Keith More MD       Current medications:    No current facility-administered medications for this encounter.     Current Outpatient Medications   Medication Sig Dispense Refill   • albuterol sulfate HFA (VENTOLIN HFA) 108 (90 Base) MCG/ACT inhaler Inhale 2 puffs into the lungs 4 times daily as needed for Wheezing 18 g 0   • B Complex Vitamins (B COMPLEX-B12 PO) Take by mouth     • Prenatal Vit-Fe Fumarate-FA (PRENATAL VITAMIN) 27-0.8 MG TABS Take by mouth     • cyanocobalamin 1000 MCG/ML injection Inject 1 mL into the muscle every 14 days Do 1 cc IM every 14 days for 2 months then go to every 28 days. 6 mL 3       Allergies:  No Known Allergies    Problem List:    Patient Active Problem List   Diagnosis Code   • PVC (premature ventricular contraction) I49.3   • History of  section Z98.891   • Obesity affecting pregnancy in third trimester O99.213   • Uterine fibroid during pregnancy, antepartum O34.10, D25.9   • History of gestational hypertension Z87.59

## 2025-01-16 NOTE — ANESTHESIA PROCEDURE NOTES
Spinal Block    Patient location during procedure: OR  End time: 1/16/2025 10:06 AM  Reason for block: primary anesthetic  Staffing  Performed: anesthesiologist   Anesthesiologist: Alvin Holly MD  Performed by: Alvin Holly MD  Authorized by: Alvin Holly MD    Spinal Block  Patient position: sitting  Prep: ChloraPrep  Patient monitoring: cardiac monitor, continuous pulse ox, frequent blood pressure checks and oxygen  Approach: midline  Location: L3/L4  Guidance: paresthesia technique  Provider prep: mask and sterile gloves  Local infiltration: lidocaine  Needle  Needle type: pencil-tip   Needle gauge: 24 G  Needle length: 3.5 in  Assessment  Sensory level: T6  Swirl obtained: Yes  CSF: clear  Attempts: 1  Hemodynamics: stable  Preanesthetic Checklist  Completed: patient identified, IV checked, site marked, risks and benefits discussed, surgical/procedural consents, equipment checked, pre-op evaluation, timeout performed, anesthesia consent given, oxygen available, monitors applied/VS acknowledged, fire risk safety assessment completed and verbalized and blood product R/B/A discussed and consented

## 2025-01-17 ENCOUNTER — ANESTHESIA (OUTPATIENT)
Dept: MOTHER INFANT UNIT | Age: 35
End: 2025-01-17
Payer: COMMERCIAL

## 2025-01-17 ENCOUNTER — ANESTHESIA EVENT (OUTPATIENT)
Dept: MOTHER INFANT UNIT | Age: 35
End: 2025-01-17
Payer: COMMERCIAL

## 2025-01-17 LAB — HGB BLD-MCNC: 10.7 G/DL (ref 11.7–15.4)

## 2025-01-17 PROCEDURE — 6370000000 HC RX 637 (ALT 250 FOR IP): Performed by: ANESTHESIOLOGY

## 2025-01-17 PROCEDURE — 6360000002 HC RX W HCPCS: Performed by: ANESTHESIOLOGY

## 2025-01-17 PROCEDURE — 1100000000 HC RM PRIVATE

## 2025-01-17 PROCEDURE — 85018 HEMOGLOBIN: CPT

## 2025-01-17 PROCEDURE — 6370000000 HC RX 637 (ALT 250 FOR IP): Performed by: OBSTETRICS & GYNECOLOGY

## 2025-01-17 PROCEDURE — 36415 COLL VENOUS BLD VENIPUNCTURE: CPT

## 2025-01-17 RX ORDER — ACETAMINOPHEN 500 MG
1000 TABLET ORAL EVERY 8 HOURS
Status: DISCONTINUED | OUTPATIENT
Start: 2025-01-17 | End: 2025-01-18 | Stop reason: HOSPADM

## 2025-01-17 RX ORDER — POLYETHYLENE GLYCOL 3350 17 G/17G
17 POWDER, FOR SOLUTION ORAL DAILY
Status: DISCONTINUED | OUTPATIENT
Start: 2025-01-17 | End: 2025-01-18 | Stop reason: HOSPADM

## 2025-01-17 RX ORDER — OXYCODONE HYDROCHLORIDE 5 MG/1
5 TABLET ORAL EVERY 4 HOURS PRN
Status: DISCONTINUED | OUTPATIENT
Start: 2025-01-17 | End: 2025-01-18 | Stop reason: HOSPADM

## 2025-01-17 RX ORDER — IBUPROFEN 800 MG/1
800 TABLET, FILM COATED ORAL EVERY 8 HOURS
Status: DISCONTINUED | OUTPATIENT
Start: 2025-01-17 | End: 2025-01-18 | Stop reason: HOSPADM

## 2025-01-17 RX ORDER — FUROSEMIDE 20 MG/1
20 TABLET ORAL DAILY
Status: DISCONTINUED | OUTPATIENT
Start: 2025-01-17 | End: 2025-01-18 | Stop reason: HOSPADM

## 2025-01-17 RX ADMIN — ACETAMINOPHEN 1000 MG: 500 TABLET, FILM COATED ORAL at 17:37

## 2025-01-17 RX ADMIN — DOCUSATE SODIUM 100 MG: 100 CAPSULE, LIQUID FILLED ORAL at 10:27

## 2025-01-17 RX ADMIN — POLYETHYLENE GLYCOL 3350 17 G: 17 POWDER, FOR SOLUTION ORAL at 13:06

## 2025-01-17 RX ADMIN — ACETAMINOPHEN 650 MG: 325 TABLET, FILM COATED ORAL at 04:14

## 2025-01-17 RX ADMIN — ACETAMINOPHEN 650 MG: 325 TABLET, FILM COATED ORAL at 10:27

## 2025-01-17 RX ADMIN — KETOROLAC TROMETHAMINE 30 MG: 30 INJECTION, SOLUTION INTRAMUSCULAR at 06:12

## 2025-01-17 RX ADMIN — FUROSEMIDE 20 MG: 20 TABLET ORAL at 13:06

## 2025-01-17 RX ADMIN — IBUPROFEN 800 MG: 800 TABLET, FILM COATED ORAL at 21:54

## 2025-01-17 RX ADMIN — KETOROLAC TROMETHAMINE 30 MG: 30 INJECTION, SOLUTION INTRAMUSCULAR at 00:10

## 2025-01-17 RX ADMIN — IBUPROFEN 800 MG: 800 TABLET, FILM COATED ORAL at 13:06

## 2025-01-17 NOTE — DISCHARGE INSTRUCTIONS
have bloody vaginal discharge. You may also pass some blood clots that shouldn't be bigger than an egg. Over the next 6 weeks or so, your bleeding should decrease a little every day and slowly change to a pinkish and then whitish discharge.  For cramps or mild pain, try an over-the-counter pain medicine, such as acetaminophen (Tylenol) or ibuprofen (Advil, Motrin). Read and follow all instructions on the label.  To ease pain around the vagina or from hemorrhoids:  Put ice or a cold pack on the area for 10 to 20 minutes at a time. Put a thin cloth between the ice and your skin.  Try sitting in a few inches of warm water (sitz bath) when you can or after bowel movements.  Clean yourself with a gentle squeeze of warm water from a bottle instead of wiping with toilet paper.  Use witch hazel or hemorrhoid pads (such as Tucks).  Try using a cold compress for sore and swollen breasts. And wear a supportive bra that fits.  Ease constipation by drinking plenty of fluids and eating high-fiber foods. Ask your doctor or midwife about over-the-counter stool softeners.  Activity  Rest when you can.  Ask for help from family or friends when you need it.  If you can, have another adult in your home for at least 2 or 3 days after birth.  When you feel ready, try to get some exercise every day. For many people, walking is a good choice. Don't do any heavy exercise until your doctor or midwife says it's okay.  Ask your doctor or midwife when it is okay to have vaginal sex.  If you don't want to get pregnant, talk to your doctor or midwife about birth control options. You can get pregnant even before your period returns. Also, you can get pregnant while you are breastfeeding.  Talk to your doctor or midwife if you want to get pregnant again. They can talk to you about when it is safe.  Emotional health  It's normal to have some sadness, anxiety, and mood swings after delivery. It may help to talk with a trusted friend or family member.

## 2025-01-17 NOTE — PROGRESS NOTES
Anesthesiology  Post-op Note    Post-op day 1 s/p  via spinal with neuraxial opioids for post-op pain management.  /76   Pulse 66   Temp 97.9 °F (36.6 °C) (Oral)   Resp 18   LMP 2024   SpO2 97%   Breastfeeding Unknown   Airway patent, patient appropriately hydrated and appears euvolemic.  Patient is Alert and oriented.  Pain is absent.  Pruritus is well controlled.  Nausea is absent.  No complaints about back or site of injection.  Motor and sensory function has returned to baseline in lower extremities. Patient is satisfied with anesthetic and reports no complications.  Continue current orders, then initiate surgeon's orders for pain management 24 hours after .  Follow up per surgeon.

## 2025-01-17 NOTE — PROGRESS NOTES
[facility]  [address], [place] [address]  [phone]    Kris Cason MD, FACOG  Cata Yoo, JODY-BC    Pt is S/P repeat  + RROS at 39 2/7 weeks due to prev C/S.  No complaints today.  Normal PO pain.  No GI/ issues.  Lochia < menses.  No F/C.    VITALS  Patient Vitals for the past 24 hrs:   BP Temp Temp src Pulse Resp SpO2   25 1045 115/68 97.7 °F (36.5 °C) Oral 77 18 99 %   25 0700 116/77 97.9 °F (36.6 °C) Oral 69 17 99 %   25 0227 123/76 97.9 °F (36.6 °C) Oral 66 18 97 %   25 2243 125/63 97.7 °F (36.5 °C) Oral 71 18 97 %   25 1850 139/74 98.1 °F (36.7 °C) Oral 72 20 96 %   25 1520 116/74 97.7 °F (36.5 °C) Oral 68 18 97 %   25 1347 116/62 97.9 °F (36.6 °C) Axillary 69 18 100 %   25 1310 (!) 145/68 97.9 °F (36.6 °C) Oral 64 16 99 %   25 1252 137/74 -- -- -- -- 96 %   25 1209 124/65 97.4 °F (36.3 °C) Oral 60 16 96 %   25 1154 125/63 -- -- 61 14 94 %   25 1139 127/66 -- -- 61 16 95 %         CV - RRR  LUNGS - CTA bilaterally  ABD - soft, NABS, appropriate tenderness, incision C/D/I  EXT - tr edema bilaterally    Labs:    Recent Results (from the past 24 hour(s))   Hemoglobin    Collection Time: 25  7:18 AM   Result Value Ref Range    Hemoglobin 10.7 (L) 11.7 - 15.4 g/dL         POD #1 LTCS     Pt is bottle feeding.  No issues or complaints today.   Stable. (+) ambulating, voiding. Adding lasix for Hx of GHTN. Routine PP/PO care    Kris Cason MD  11:26 AM  25

## 2025-01-17 NOTE — PROGRESS NOTES
Patient up to bathroom with RN assistance.  Christine-care taught and completed. Questions encouraged and answered.  Patient ambulating without difficulty, encouraged to call for needs or concerns. Verbalizes understanding.

## 2025-01-18 VITALS
TEMPERATURE: 97.7 F | RESPIRATION RATE: 16 BRPM | SYSTOLIC BLOOD PRESSURE: 127 MMHG | DIASTOLIC BLOOD PRESSURE: 78 MMHG | OXYGEN SATURATION: 99 % | HEART RATE: 64 BPM

## 2025-01-18 PROCEDURE — 6370000000 HC RX 637 (ALT 250 FOR IP): Performed by: OBSTETRICS & GYNECOLOGY

## 2025-01-18 RX ORDER — OXYCODONE AND ACETAMINOPHEN 5; 325 MG/1; MG/1
1 TABLET ORAL EVERY 6 HOURS PRN
Qty: 28 TABLET | Refills: 0 | Status: SHIPPED | OUTPATIENT
Start: 2025-01-18 | End: 2025-01-25

## 2025-01-18 RX ORDER — IBUPROFEN 600 MG/1
600 TABLET, FILM COATED ORAL EVERY 6 HOURS PRN
Qty: 60 TABLET | Refills: 0 | Status: SHIPPED | OUTPATIENT
Start: 2025-01-18

## 2025-01-18 RX ORDER — FUROSEMIDE 20 MG/1
20 TABLET ORAL DAILY
Qty: 4 TABLET | Refills: 0 | Status: SHIPPED | OUTPATIENT
Start: 2025-01-19

## 2025-01-18 RX ADMIN — OXYCODONE 5 MG: 5 TABLET ORAL at 09:32

## 2025-01-18 RX ADMIN — IBUPROFEN 800 MG: 800 TABLET, FILM COATED ORAL at 05:47

## 2025-01-18 RX ADMIN — DOCUSATE SODIUM 100 MG: 100 CAPSULE, LIQUID FILLED ORAL at 09:27

## 2025-01-18 RX ADMIN — ACETAMINOPHEN 1000 MG: 500 TABLET, FILM COATED ORAL at 09:28

## 2025-01-18 RX ADMIN — ACETAMINOPHEN 1000 MG: 500 TABLET, FILM COATED ORAL at 02:04

## 2025-01-18 RX ADMIN — IBUPROFEN 800 MG: 800 TABLET, FILM COATED ORAL at 13:38

## 2025-01-18 RX ADMIN — FUROSEMIDE 20 MG: 20 TABLET ORAL at 09:27

## 2025-01-18 RX ADMIN — OXYCODONE 5 MG: 5 TABLET ORAL at 03:56

## 2025-01-18 RX ADMIN — POLYETHYLENE GLYCOL 3350 17 G: 17 POWDER, FOR SOLUTION ORAL at 09:27

## 2025-01-18 ASSESSMENT — PAIN DESCRIPTION - ORIENTATION: ORIENTATION: LOWER

## 2025-01-18 ASSESSMENT — PAIN SCALES - GENERAL
PAINLEVEL_OUTOF10: 5
PAINLEVEL_OUTOF10: 1
PAINLEVEL_OUTOF10: 5

## 2025-01-18 ASSESSMENT — PAIN DESCRIPTION - DESCRIPTORS: DESCRIPTORS: ACHING

## 2025-01-18 ASSESSMENT — PAIN DESCRIPTION - LOCATION
LOCATION: ABDOMEN
LOCATION: INCISION

## 2025-01-18 NOTE — CARE COORDINATION
Chart reviewed.  Demographics verified with patient.  Keith More at [facility] is the selected pediatrician for the infant.      Patient reports no history of anxiety/depression. Reports strong support system at home with friends and family, in addition to her , Jose Healy.    Patient given informational packet on  mood & anxiety disorders (resources/education).    RNCM provided education on [facility] [place] [facility].  Family declined referral for home visit.      Family denies any additional needs from case management at this time.  Family has 's contact information should any needs/questions arise.      Megan Ledesma RN, BSN  Case Management  [facility]

## 2025-01-18 NOTE — PROGRESS NOTES
[facility]  [address], [place] [address]  [phone]    Kris Cason MD, FACOG  Cata Yoo JODY-BC    Pt is S/P repeat  + RROS at 39 2/1 weeks due to prev C/S.  No complaints today.  Normal PO pain.  No GI/ issues.  Lochia < menses.  No F/C.    VITALS  Patient Vitals for the past 24 hrs:   BP Temp Temp src Pulse Resp SpO2   25 0720 127/78 97.7 °F (36.5 °C) Oral 64 16 99 %   25 0349 116/73 97.7 °F (36.5 °C) Oral 64 18 95 %   25 124/79 98.1 °F (36.7 °C) Oral 69 14 97 %         CV - RRR  LUNGS - CTA bilaterally  ABD - soft, NABS, appropriate tenderness, incision C/D/I  EXT - tr edema bilaterally    Labs:  No results found for this or any previous visit (from the past 24 hour(s)).      POD #2 LTCS     Pt is bottle feeding.  No issues or complaints today.   Stable. (+) ambulating, voiding. Pt desires D/C today. Routine PP/PO instructions.    Kris Cason MD  11:04 AM  25

## 2025-01-18 NOTE — PLAN OF CARE
Problem: Postpartum  Goal: Experiences normal postpartum course  Description:  Postpartum OB-Pregnancy care plan goal which identifies if the mother is experiencing a normal postpartum course  2025 by Brittni Goodwin RN  Outcome: Progressing  2025 1403 by Mukesh Jarrell RN  Outcome: Progressing  Goal: Appropriate maternal -  bonding  Description:  Postpartum OB-Pregnancy care plan goal which identifies if the mother and  are bonding appropriately  2025 by Brittni Goodwin RN  Outcome: Progressing  2025 1403 by Mukesh Jarrell RN  Outcome: Progressing  Goal: Establishment of infant feeding pattern  Description:  Postpartum OB-Pregnancy care plan goal which identifies if the mother is establishing a feeding pattern with their   2025 by Brittni Goodwin RN  Outcome: Progressing  2025 by Mukesh Jarrell RN  Outcome: Progressing  Goal: Incisions, wounds, or drain sites healing without S/S of infection  2025 by Brittni Goodwin RN  Outcome: Progressing  2025 1403 by Mukesh Jarrell RN  Outcome: Progressing     Problem: Pain  Goal: Verbalizes/displays adequate comfort level or baseline comfort level  2025 by Brittni Goodwin RN  Outcome: Progressing  2025 by Mukesh Jarrell RN  Outcome: Progressing  Flowsheets (Taken 2025 0815)  Verbalizes/displays adequate comfort level or baseline comfort level:   Encourage patient to monitor pain and request assistance   Assess pain using appropriate pain scale   Administer analgesics based on type and severity of pain and evaluate response   Implement non-pharmacological measures as appropriate and evaluate response   Consider cultural and social influences on pain and pain management   Notify Licensed Independent Practitioner if interventions unsuccessful or patient reports new pain     Problem: Infection - Adult  Goal: Absence of infection at

## 2025-01-19 NOTE — DISCHARGE SUMMARY
[facility]  [address] [facility], [place] [address]  [phone]    Kris Cason MD, FACOG  Cata Yoo JODY-BC  Date of Admission:  2025  7:41 AM  Date of Discharge:  2025  1:40 PM    Patient Active Problem List   Diagnosis    PVC (premature ventricular contraction)    History of  section    Obesity affecting pregnancy in third trimester    Uterine fibroid during pregnancy, antepartum    History of gestational hypertension    High-risk pregnancy in third trimester    Vitamin B12 deficiency    At risk for cancer- Approved 12/10    History of  delivery        Patient is a 34 y.o.  at 39w2d wks who was admitted for repeat  with RROS. The indication for  was prev C/S, increased risk for ovarian CA.  A Low Transverse  was performed with normal amount of blood loss. On post-op day #1, the patient had her catheter removed and was ambulating well in the howard.  Her post operative hemoglobin was stable. Patient had a normal post operative course.  Incision stayed clean and dry, without erythema.  Patient remained afebrile throughout the entire hospital stay.  On day of discharge, she was discharged home in good condition with routine post  instructions.  She was bottle feeding the infant on discharge. Pt was scheduled to follow up in two weeks for an incision check at [facility].      Discharge Meds:       Medication List        START taking these medications      furosemide 20 MG tablet  Commonly known as: LASIX  Take 1 tablet by mouth daily     ibuprofen 600 MG tablet  Commonly known as: ADVIL;MOTRIN  Take 1 tablet by mouth every 6 hours as needed for Pain     oxyCODONE-acetaminophen 5-325 MG per tablet  Commonly known as: Percocet  Take 1 tablet by mouth every 6 hours as needed for Pain for up to 7 days. Max Daily Amount: 4 tablets            CONTINUE taking these medications      B COMPLEX-B12 PO     cyanocobalamin 1000 MCG/ML

## 2025-01-23 ENCOUNTER — CARE COORDINATION (OUTPATIENT)
Dept: OTHER | Facility: CLINIC | Age: 35
End: 2025-01-23

## 2025-01-23 NOTE — CARE COORDINATION
reviewed. The patient is to call OBGYN provider or ACM if any occur. Discharge instructions reviewed, instructions include pelvic rest, incision care if applicable, weight restrictions and driving restrictions (no driving with pain medicine). Social determinants of health impacting care: None. Shared Decision Making completed with patient regarding benefits of CM.  Patient's PPV is scheduled at 2 and 6 weeks PP. She denies any red-flag s/s or needs r/t DME, medications or appointments. She is agreeable to a follow-up call with ACM in 4 weeks and will call with any needs in the meantime.       PHILIP AvitiaN, RN  Associate Care Manager   Cell: [phone]  [email or web address]

## 2025-01-28 ENCOUNTER — POSTPARTUM VISIT (OUTPATIENT)
Dept: OBGYN CLINIC | Age: 35
End: 2025-01-28

## 2025-01-28 VITALS
SYSTOLIC BLOOD PRESSURE: 130 MMHG | DIASTOLIC BLOOD PRESSURE: 84 MMHG | HEIGHT: 63 IN | BODY MASS INDEX: 35.79 KG/M2 | WEIGHT: 202 LBS

## 2025-01-28 DIAGNOSIS — Z09 POSTOP CHECK: Primary | ICD-10-CM

## 2025-01-28 PROCEDURE — 0503F POSTPARTUM CARE VISIT: CPT | Performed by: OBSTETRICS & GYNECOLOGY

## 2025-01-28 NOTE — PROGRESS NOTES
CC:  C/S postop follow-up    HPI:  Tana presents for postop visit from  about 2 weeks ago.  Patient doing well postpartum without significant complaints. Voiding without difficulty. Pain controlled on no medications at this point other than PRN Ibuprofen.  Light spotting only.  +BM/Flatus.  Denies n/v, tolerating regular diet.     Also Hx of GHTN and discharged home on lasix so here for BP check.     Patient Active Problem List    Diagnosis Date Noted    History of  delivery 2025    At risk for cancer- Approved 12/10 2024    Vitamin B12 deficiency 2024    High-risk pregnancy in third trimester 2024    History of gestational hypertension 2024    History of  section 2024    Obesity affecting pregnancy in third trimester 2024    Uterine fibroid during pregnancy, antepartum 2024    PVC (premature ventricular contraction) 2023          Physical Exam:   /84   Ht 1.6 m (5' 3\")   Wt 91.6 kg (202 lb)   LMP 2024   Breastfeeding No   BMI 35.78 kg/m²   Constitutional: She appears well-developed and well-nourished. No distress.   HENT:    Head: Normocephalic and atraumatic.   Lungs: CTAB, effort normal  Cardiovascular: RRR, no M/R/G  Abd:  Fundus firm and well below umbilicus, S/NTTP/ND, BS normoactive.  Incision c/d/i w/out erythema/induration   Skin: She is not diaphoretic.   Psychiatric: She has a normal mood and affect. Her behavior is normal. Thought content normal.         A/P:  Stable Post op condition.  Gradually increase activity; continue pelvic rest and keep lifting to <15lbs until 6wks pp.  Follow up  4 weeks for routine 6wk pp apt   BP normal today  S/p RROS    Ena Villavicencio DO

## 2025-01-31 DIAGNOSIS — E53.8 B12 DEFICIENCY: ICD-10-CM

## 2025-01-31 RX ORDER — CYANOCOBALAMIN 1000 UG/ML
1000 INJECTION, SOLUTION INTRAMUSCULAR; SUBCUTANEOUS
Qty: 6 ML | Refills: 3 | Status: SHIPPED | OUTPATIENT
Start: 2025-01-31

## 2025-01-31 NOTE — TELEPHONE ENCOUNTER
Sent in prescription for:     Requested Prescriptions     Signed Prescriptions Disp Refills    cyanocobalamin 1000 MCG/ML injection 6 mL 3     Sig: Inject 1 mL into the muscle every 14 days Do 1 cc IM every 14 days for 2 months then go to every 28 days.     Authorizing Provider: ITALIA BENSON

## 2025-02-13 ENCOUNTER — TELEPHONE (OUTPATIENT)
Dept: FAMILY MEDICINE CLINIC | Facility: CLINIC | Age: 35
End: 2025-02-13

## 2025-02-13 DIAGNOSIS — E53.8 B12 DEFICIENCY: ICD-10-CM

## 2025-02-13 RX ORDER — CYANOCOBALAMIN 1000 UG/ML
1000 INJECTION, SOLUTION INTRAMUSCULAR; SUBCUTANEOUS
Qty: 6 ML | Refills: 3 | Status: SHIPPED | OUTPATIENT
Start: 2025-02-13

## 2025-02-25 ENCOUNTER — POSTPARTUM VISIT (OUTPATIENT)
Dept: OBGYN CLINIC | Age: 35
End: 2025-02-25

## 2025-02-25 VITALS
DIASTOLIC BLOOD PRESSURE: 70 MMHG | HEIGHT: 63 IN | BODY MASS INDEX: 36.86 KG/M2 | WEIGHT: 208 LBS | SYSTOLIC BLOOD PRESSURE: 120 MMHG

## 2025-02-25 PROCEDURE — 0503F POSTPARTUM CARE VISIT: CPT | Performed by: OBSTETRICS & GYNECOLOGY

## 2025-02-25 NOTE — PROGRESS NOTES
6 week Postpartum Office Visit    Tana Healy is a 34 y.o.  that presents for PP visit today    Birth Control: S/p RR salpingectomy at time of CS     Breast/Bottle: bottle     Baby: Doing well    Bowel/Bladder: No issues    Blues: None      OB History    Para Term  AB Living   2 2 2 0 0 2   SAB IAB Ectopic Molar Multiple Live Births           0 2      # Outcome Date GA Lbr Joseluis/2nd Weight Sex Type Anes PTL Lv   2 Term 26 39w2d  3.61 kg (7 lb 15.3 oz) M CS-LTranv Spinal N JAMES   1 Term 22 38w4d  3.59 kg (7 lb 14.6 oz) M CS-LTranv Spinal N JAMES      Complications: Breech presentation        Past Medical History:   Diagnosis Date    B12 deficiency     Broken foot     right    COVID-19 2020    Gestational hypertension     Never put on any antihypertensives    Oligohydramnios     With G1    PVC (premature ventricular contraction)     Cardiology workup-negative. Echo and Stress Test and was negative    Uterine fibroids affecting pregnancy     noted on initial ultrasound       Past Surgical History:   Procedure Laterality Date     SECTION  2021     SECTION Bilateral 2025     SECTION BILATERAL SALPINGECTOMY performed by Ena Villavicencio DO at [facility]        Social History     Socioeconomic History    Marital status: Single     Spouse name: Not on file    Number of children: Not on file    Years of education: Not on file    Highest education level: Not on file   Occupational History    Not on file   Tobacco Use    Smoking status: Never     Passive exposure: Never    Smokeless tobacco: Never   Vaping Use    Vaping status: Never Used   Substance and Sexual Activity    Alcohol use: Not Currently    Drug use: Never    Sexual activity: Yes     Partners: Male     Birth control/protection: Pill   Other Topics Concern    Not on file   Social History Narrative    Not on file     Social Determinants of Health     Financial Resource Strain:

## 2025-03-20 ENCOUNTER — OFFICE VISIT (OUTPATIENT)
Dept: FAMILY MEDICINE CLINIC | Facility: CLINIC | Age: 35
End: 2025-03-20
Payer: COMMERCIAL

## 2025-03-20 VITALS
HEART RATE: 73 BPM | RESPIRATION RATE: 16 BRPM | OXYGEN SATURATION: 97 % | WEIGHT: 204 LBS | SYSTOLIC BLOOD PRESSURE: 141 MMHG | DIASTOLIC BLOOD PRESSURE: 91 MMHG | TEMPERATURE: 98.5 F | BODY MASS INDEX: 36.14 KG/M2 | HEIGHT: 63 IN

## 2025-03-20 DIAGNOSIS — F41.9 ANXIETY: ICD-10-CM

## 2025-03-20 DIAGNOSIS — F98.8 ATTENTION DEFICIT DISORDER (ADD) WITHOUT HYPERACTIVITY: Primary | ICD-10-CM

## 2025-03-20 PROCEDURE — 99213 OFFICE O/P EST LOW 20 MIN: CPT | Performed by: FAMILY MEDICINE

## 2025-03-20 RX ORDER — BUPROPION HYDROCHLORIDE 150 MG/1
150 TABLET ORAL EVERY MORNING
Qty: 30 TABLET | Refills: 5 | Status: SHIPPED | OUTPATIENT
Start: 2025-03-20

## 2025-03-20 ASSESSMENT — PATIENT HEALTH QUESTIONNAIRE - PHQ9
SUM OF ALL RESPONSES TO PHQ QUESTIONS 1-9: 0
SUM OF ALL RESPONSES TO PHQ QUESTIONS 1-9: 0
2. FEELING DOWN, DEPRESSED OR HOPELESS: NOT AT ALL
1. LITTLE INTEREST OR PLEASURE IN DOING THINGS: NOT AT ALL
SUM OF ALL RESPONSES TO PHQ QUESTIONS 1-9: 0
SUM OF ALL RESPONSES TO PHQ QUESTIONS 1-9: 0

## 2025-03-20 NOTE — PROGRESS NOTES
dysphoric mood. The patient is nervous/anxious.        Blood pressure (!) 141/91, pulse 73, temperature 98.5 °F (36.9 °C), temperature source Temporal, resp. rate 16, height 1.6 m (5' 3\"), weight 92.5 kg (204 lb), last menstrual period 03/05/2025, SpO2 97%, not currently breastfeeding.    Physical Exam  Constitutional:       General: She is not in acute distress.     Appearance: Normal appearance.   Neurological:      General: No focal deficit present.      Mental Status: She is alert and oriented to person, place, and time.            ASSESSMENT and PLAN  Tana was seen today for add and anxiety.    Diagnoses and all orders for this visit:    Attention deficit disorder (ADD) without hyperactivity  -     buPROPion (WELLBUTRIN XL) 150 MG extended release tablet; Take 1 tablet by mouth every morning    Anxiety  -     buPROPion (WELLBUTRIN XL) 150 MG extended release tablet; Take 1 tablet by mouth every morning    Will try Wellbutrin and see if it helps both with symptoms of ADD and the anxiety.    Follow-up and Dispositions    Return in about 2 months (around 5/20/2025), or if symptoms worsen or fail to improve.          Keith More MD  03/20/25    Dictated using voice recognition software. Proofread, but unrecognized errors may exist.   
05-Jun-2019 20:00

## 2025-05-27 ENCOUNTER — OFFICE VISIT (OUTPATIENT)
Dept: FAMILY MEDICINE CLINIC | Facility: CLINIC | Age: 35
End: 2025-05-27
Payer: COMMERCIAL

## 2025-05-27 VITALS
TEMPERATURE: 98.1 F | DIASTOLIC BLOOD PRESSURE: 77 MMHG | RESPIRATION RATE: 16 BRPM | WEIGHT: 209 LBS | HEIGHT: 63 IN | BODY MASS INDEX: 37.03 KG/M2 | SYSTOLIC BLOOD PRESSURE: 134 MMHG | OXYGEN SATURATION: 96 % | HEART RATE: 56 BPM

## 2025-05-27 DIAGNOSIS — L65.9 HAIR LOSS: ICD-10-CM

## 2025-05-27 DIAGNOSIS — Z00.00 PERIODIC HEALTH ASSESSMENT, GENERAL SCREENING, ADULT: ICD-10-CM

## 2025-05-27 DIAGNOSIS — R63.5 WEIGHT GAIN: ICD-10-CM

## 2025-05-27 DIAGNOSIS — R53.83 FATIGUE, UNSPECIFIED TYPE: Primary | ICD-10-CM

## 2025-05-27 DIAGNOSIS — E53.8 B12 DEFICIENCY: ICD-10-CM

## 2025-05-27 PROCEDURE — 99214 OFFICE O/P EST MOD 30 MIN: CPT | Performed by: FAMILY MEDICINE

## 2025-05-27 RX ORDER — DIETHYLPROPION HYDROCHLORIDE 75 MG/1
75 TABLET, EXTENDED RELEASE ORAL
Qty: 30 TABLET | Refills: 5 | Status: SHIPPED | OUTPATIENT
Start: 2025-05-27 | End: 2025-11-23

## 2025-05-27 NOTE — PROGRESS NOTES
HISTORY OF PRESENT ILLNESS     Tana Healy is a 34 y.o. female who presents for       HPI  History of Present Illness  The patient presents for ADD, weight management, and hair loss.    She initiated Wellbutrin therapy but experienced transient dizziness in the afternoons after approximately 2 weeks of use. The dizziness lasted for about 20 to 30 minutes and was not severe enough to require sitting down. She discontinued the medication and has not resumed it, noting no further episodes of dizziness. She reports no significant improvement in focus while on Wellbutrin. Since returning to work, she has established a more consistent routine, which she believes has alleviated some of her anxiety. She is considering trying another medication for her ADD symptoms. She has been consuming an energy drink in the mornings, which she feels may be beneficial. She notes a significant decrease in her energy levels and wonders if this could be affecting her focus. She has previously tried Vyvanse but discontinued it due to side effects.    She reports experiencing hair loss, which she attributes to postpartum changes. Her menstrual cycle has resumed and is notably heavy. She has also observed weight gain, which she believes may be related to her dietary habits. She recalls a previous episode of nausea following water consumption, which she suspects may have been due to the taste of the water. She has tried Adipex for weight loss but discontinued it due to adverse effects.    GYNECOLOGICAL HISTORY:  - Frequency and Flow: Heavy    PAST SURGICAL HISTORY:  - Tubal ligation      Past Medical History:   Diagnosis Date    B12 deficiency     Broken foot 2020    right    COVID-19 07/2020    Gestational hypertension 2021    Never put on any antihypertensives    Oligohydramnios 2021    With G1    PVC (premature ventricular contraction) 2023    Cardiology workup-negative. Echo and Stress Test and was negative    Uterine fibroids affecting

## 2025-06-03 ENCOUNTER — LAB (OUTPATIENT)
Dept: FAMILY MEDICINE CLINIC | Facility: CLINIC | Age: 35
End: 2025-06-03

## 2025-06-03 DIAGNOSIS — R53.83 FATIGUE, UNSPECIFIED TYPE: ICD-10-CM

## 2025-06-03 DIAGNOSIS — Z00.00 PERIODIC HEALTH ASSESSMENT, GENERAL SCREENING, ADULT: ICD-10-CM

## 2025-06-03 DIAGNOSIS — L65.9 HAIR LOSS: ICD-10-CM

## 2025-06-03 DIAGNOSIS — R63.5 WEIGHT GAIN: ICD-10-CM

## 2025-06-03 DIAGNOSIS — E53.8 B12 DEFICIENCY: ICD-10-CM

## 2025-06-03 LAB
ALBUMIN SERPL-MCNC: 3.8 G/DL (ref 3.5–5)
ALBUMIN/GLOB SERPL: 1.1 (ref 1–1.9)
ALP SERPL-CCNC: 93 U/L (ref 35–104)
ALT SERPL-CCNC: 21 U/L (ref 8–45)
ANION GAP SERPL CALC-SCNC: 11 MMOL/L (ref 7–16)
AST SERPL-CCNC: 20 U/L (ref 15–37)
BASOPHILS # BLD: 0.06 K/UL (ref 0–0.2)
BASOPHILS NFR BLD: 0.7 % (ref 0–2)
BILIRUB SERPL-MCNC: 0.9 MG/DL (ref 0–1.2)
BUN SERPL-MCNC: 13 MG/DL (ref 6–23)
CALCIUM SERPL-MCNC: 9.6 MG/DL (ref 8.8–10.2)
CHLORIDE SERPL-SCNC: 103 MMOL/L (ref 98–107)
CHOLEST SERPL-MCNC: 179 MG/DL (ref 0–200)
CO2 SERPL-SCNC: 26 MMOL/L (ref 20–29)
CREAT SERPL-MCNC: 0.71 MG/DL (ref 0.6–1.1)
DIFFERENTIAL METHOD BLD: NORMAL
EOSINOPHIL # BLD: 0.16 K/UL (ref 0–0.8)
EOSINOPHIL NFR BLD: 1.9 % (ref 0.5–7.8)
ERYTHROCYTE [DISTWIDTH] IN BLOOD BY AUTOMATED COUNT: 13.2 % (ref 11.9–14.6)
FERRITIN SERPL-MCNC: 15 NG/ML (ref 8–388)
GLOBULIN SER CALC-MCNC: 3.4 G/DL (ref 2.3–3.5)
GLUCOSE SERPL-MCNC: 92 MG/DL (ref 70–99)
HCT VFR BLD AUTO: 42.7 % (ref 35.8–46.3)
HDLC SERPL-MCNC: 62 MG/DL (ref 40–60)
HDLC SERPL: 2.9 (ref 0–5)
HGB BLD-MCNC: 13.8 G/DL (ref 11.7–15.4)
IMM GRANULOCYTES # BLD AUTO: 0.02 K/UL (ref 0–0.5)
IMM GRANULOCYTES NFR BLD AUTO: 0.2 % (ref 0–5)
LDLC SERPL CALC-MCNC: 101 MG/DL (ref 0–100)
LYMPHOCYTES # BLD: 3 K/UL (ref 0.5–4.6)
LYMPHOCYTES NFR BLD: 36.5 % (ref 13–44)
MCH RBC QN AUTO: 29.8 PG (ref 26.1–32.9)
MCHC RBC AUTO-ENTMCNC: 32.3 G/DL (ref 31.4–35)
MCV RBC AUTO: 92.2 FL (ref 82–102)
MONOCYTES # BLD: 0.7 K/UL (ref 0.1–1.3)
MONOCYTES NFR BLD: 8.5 % (ref 4–12)
NEUTS SEG # BLD: 4.28 K/UL (ref 1.7–8.2)
NEUTS SEG NFR BLD: 52.2 % (ref 43–78)
NRBC # BLD: 0 K/UL (ref 0–0.2)
PLATELET # BLD AUTO: 420 K/UL (ref 150–450)
PMV BLD AUTO: 10.7 FL (ref 9.4–12.3)
POTASSIUM SERPL-SCNC: 5.2 MMOL/L (ref 3.5–5.1)
PROT SERPL-MCNC: 7.2 G/DL (ref 6.3–8.2)
RBC # BLD AUTO: 4.63 M/UL (ref 4.05–5.2)
SODIUM SERPL-SCNC: 140 MMOL/L (ref 136–145)
TRIGL SERPL-MCNC: 81 MG/DL (ref 0–150)
TSH, 3RD GENERATION: 0.76 UIU/ML (ref 0.27–4.2)
VIT B12 SERPL-MCNC: 911 PG/ML (ref 193–986)
VLDLC SERPL CALC-MCNC: 16 MG/DL (ref 6–23)
WBC # BLD AUTO: 8.2 K/UL (ref 4.3–11.1)

## 2025-06-04 ENCOUNTER — RESULTS FOLLOW-UP (OUTPATIENT)
Dept: FAMILY MEDICINE CLINIC | Facility: CLINIC | Age: 35
End: 2025-06-04

## 2025-06-10 DIAGNOSIS — R63.5 WEIGHT GAIN: ICD-10-CM

## 2025-06-10 RX ORDER — DIETHYLPROPION HYDROCHLORIDE 75 MG/1
75 TABLET, EXTENDED RELEASE ORAL
Qty: 30 TABLET | Refills: 5 | Status: SHIPPED | OUTPATIENT
Start: 2025-06-10 | End: 2025-12-07

## 2025-06-27 ENCOUNTER — LAB (OUTPATIENT)
Dept: FAMILY MEDICINE CLINIC | Facility: CLINIC | Age: 35
End: 2025-06-27

## 2025-06-27 ENCOUNTER — OFFICE VISIT (OUTPATIENT)
Dept: OBGYN CLINIC | Age: 35
End: 2025-06-27
Payer: COMMERCIAL

## 2025-06-27 VITALS — BODY MASS INDEX: 37.24 KG/M2 | WEIGHT: 210.2 LBS | SYSTOLIC BLOOD PRESSURE: 124 MMHG | DIASTOLIC BLOOD PRESSURE: 72 MMHG

## 2025-06-27 DIAGNOSIS — N91.2 AMENORRHEA: Primary | ICD-10-CM

## 2025-06-27 DIAGNOSIS — N91.2 AMENORRHEA: ICD-10-CM

## 2025-06-27 LAB — PROLACTIN SERPL-MCNC: 5.7 NG/ML (ref 4.8–23.3)

## 2025-06-27 PROCEDURE — 99214 OFFICE O/P EST MOD 30 MIN: CPT | Performed by: OBSTETRICS & GYNECOLOGY

## 2025-06-27 RX ORDER — NORETHINDRONE ACETATE AND ETHINYL ESTRADIOL 1MG-20(21)
1 KIT ORAL DAILY
Qty: 1 PACKET | Refills: 12 | Status: SHIPPED | OUTPATIENT
Start: 2025-06-27

## 2025-06-27 RX ORDER — MEDROXYPROGESTERONE ACETATE 10 MG
10 TABLET ORAL DAILY
Qty: 10 TABLET | Refills: 0 | Status: SHIPPED | OUTPATIENT
Start: 2025-06-27

## 2025-06-27 NOTE — PROGRESS NOTES
CC:   Chief Complaint   Patient presents with    Amenorrhea         HPI:    Tana  is a 34 y.o. , , Patient's last menstrual period was 2025.    History of Present Illness  The patient presents for evaluation of irregular periods.    She reports a history of regular menstrual cycles, but over the past 6 weeks, she has not experienced any menstruation. She has observed some weight gain, which she attributes to poor dietary habits. Her weight typically fluctuates between 201 and 203 pounds, but it has recently increased to 210 pounds. She is not experiencing any severe headaches or loss of peripheral vision. She also reports no migraines with facial weakness or blood clots. She is not currently on any treatment for hypertension. She has previously used birth control pills.       Delivered via CS in 2025 TSH wnl with PCP     Contraception:  s/p bilateral salpingectomy    GYN HISTORY:  As per HPI  Last Pap:  2023        Current Outpatient Medications on File Prior to Visit   Medication Sig Dispense Refill    cyanocobalamin 1000 MCG/ML injection Inject 1 mL into the muscle every 14 days Do 1 cc IM every 14 days for 2 months then go to every 28 days. 6 mL 3    Diethylpropion HCl CR 75 MG TB24 Take 75 mg by mouth daily (with breakfast) for 180 days. Max Daily Amount: 75 mg 30 tablet 5    B Complex Vitamins (B COMPLEX-B12 PO) Take by mouth (Patient not taking: Reported on 2025)       No current facility-administered medications on file prior to visit.         Past Medical History:   Diagnosis Date    B12 deficiency     Broken foot     right    COVID-19 2020    Gestational hypertension     Never put on any antihypertensives    Oligohydramnios     With G1    PVC (premature ventricular contraction)     Cardiology workup-negative. Echo and Stress Test and was negative    Uterine fibroids affecting pregnancy     noted on initial ultrasound         Past Surgical History:

## 2025-06-30 ENCOUNTER — RESULTS FOLLOW-UP (OUTPATIENT)
Dept: OBGYN CLINIC | Age: 35
End: 2025-06-30

## 2025-07-17 ENCOUNTER — CLINICAL SUPPORT (OUTPATIENT)
Dept: FAMILY MEDICINE CLINIC | Facility: CLINIC | Age: 35
End: 2025-07-17
Payer: COMMERCIAL

## 2025-07-17 DIAGNOSIS — E53.8 B12 DEFICIENCY: Primary | ICD-10-CM

## 2025-07-17 PROCEDURE — 96372 THER/PROPH/DIAG INJ SC/IM: CPT | Performed by: FAMILY MEDICINE

## 2025-07-17 RX ADMIN — CYANOCOBALAMIN 1000 MCG: 1000 INJECTION, SOLUTION INTRAMUSCULAR; SUBCUTANEOUS at 01:25

## 2025-07-18 DIAGNOSIS — R63.5 WEIGHT GAIN: ICD-10-CM

## 2025-07-18 RX ORDER — CYANOCOBALAMIN 1000 UG/ML
1000 INJECTION, SOLUTION INTRAMUSCULAR; SUBCUTANEOUS ONCE
Status: COMPLETED | OUTPATIENT
Start: 2025-07-18 | End: 2025-07-17

## 2025-07-18 RX ORDER — DIETHYLPROPION HYDROCHLORIDE 75 MG/1
75 TABLET, EXTENDED RELEASE ORAL
Qty: 30 TABLET | Refills: 5 | Status: CANCELLED | OUTPATIENT
Start: 2025-07-18 | End: 2026-01-14

## 2025-07-18 NOTE — PROGRESS NOTES
Pt came in today to receive her b12 injection  Pt received it in the left deltoid.   Pt showed no signs of a reaction  Pt will return in a month for next injection.

## 2025-07-18 NOTE — TELEPHONE ENCOUNTER
Pt called in asking for her Diethylpropion HCL Cr to be sent to [facility] in [place].

## 2025-07-21 ENCOUNTER — TELEPHONE (OUTPATIENT)
Dept: FAMILY MEDICINE CLINIC | Facility: CLINIC | Age: 35
End: 2025-07-21

## 2025-07-21 DIAGNOSIS — R63.5 WEIGHT GAIN: ICD-10-CM

## 2025-07-21 RX ORDER — DIETHYLPROPION HYDROCHLORIDE 75 MG/1
75 TABLET, EXTENDED RELEASE ORAL
Qty: 30 TABLET | Refills: 5 | Status: SHIPPED | OUTPATIENT
Start: 2025-07-21 | End: 2026-01-17

## 2025-07-21 NOTE — TELEPHONE ENCOUNTER
Sent in prescription for:     Requested Prescriptions     Signed Prescriptions Disp Refills    Diethylpropion HCl CR 75 MG TB24 30 tablet 5     Sig: Take 75 mg by mouth daily (with breakfast) for 180 days. Max Daily Amount: 75 mg     Authorizing Provider: ITALIA BENSON

## 2025-08-08 DIAGNOSIS — E53.8 B12 DEFICIENCY: ICD-10-CM

## 2025-08-08 RX ORDER — CYANOCOBALAMIN 1000 UG/ML
1000 INJECTION, SOLUTION INTRAMUSCULAR; SUBCUTANEOUS
Qty: 6 ML | Refills: 3 | Status: SHIPPED | OUTPATIENT
Start: 2025-08-08

## 2025-08-19 ENCOUNTER — TELEPHONE (OUTPATIENT)
Dept: FAMILY MEDICINE CLINIC | Facility: CLINIC | Age: 35
End: 2025-08-19

## 2025-08-19 RX ORDER — DOXYCYCLINE HYCLATE 100 MG
100 TABLET ORAL 2 TIMES DAILY
Qty: 20 TABLET | Refills: 0 | Status: SHIPPED | OUTPATIENT
Start: 2025-08-19 | End: 2025-08-29

## 2025-08-29 ENCOUNTER — TELEPHONE (OUTPATIENT)
Dept: FAMILY MEDICINE CLINIC | Facility: CLINIC | Age: 35
End: 2025-08-29

## 2025-08-29 DIAGNOSIS — E53.8 B12 DEFICIENCY: Primary | ICD-10-CM

## 2025-08-29 RX ORDER — CYANOCOBALAMIN 1000 UG/ML
1000 INJECTION, SOLUTION INTRAMUSCULAR; SUBCUTANEOUS
Qty: 6 ML | Refills: 3 | Status: SHIPPED | OUTPATIENT
Start: 2025-08-29

## (undated) DEVICE — SOLUTION IRRIG 1000ML H2O STRL BLT

## (undated) DEVICE — SURGICAL PROCEDURE PACK C SECT CDS

## (undated) DEVICE — SUTURE PLN GUT SZ 2-0 L27IN ABSRB YELLOWISH TAN L40MM CT 853H

## (undated) DEVICE — SUTURE CHROMIC GUT SZ 0 L36IN ABSRB BRN CTX L48MM 1/2 CIR 904H

## (undated) DEVICE — STERILE LATEX POWDER-FREE SURGICAL GLOVESWITH NITRILE COATING: Brand: PROTEXIS

## (undated) DEVICE — SOLUTION IV 1000ML 0.9% SOD CHL

## (undated) DEVICE — SUTURE VICRYL SZ 0 L36IN ABSRB UD CT-1 L36MM 1/2 CIR TAPR PNT VCP946H

## (undated) DEVICE — MEDI-VAC NON-CONDUCTIVE SUCTION TUBING: Brand: CARDINAL HEALTH

## (undated) DEVICE — SUTURE PDS + SZ 0 L27IN ABSRB VLT L36MM CT 1 1 2 CIR PDP340H

## (undated) DEVICE — SUTURE VCRL SZ 0 L36IN ABSRB UD L48MM CTX 1/2 CIR J978H

## (undated) DEVICE — REM POLYHESIVE ADULT PATIENT RETURN ELECTRODE: Brand: VALLEYLAB

## (undated) DEVICE — SUTURE VCRL SZ 0 L36IN ABSRB UD L36MM CT-1 1/2 CIR J946H

## (undated) DEVICE — 2DE12 3-0 UNDYD MONODERM 14X14: Brand: 2DE12 3-0 UNDYD MONODERM 14X14

## (undated) DEVICE — SUTURE MONOCRYL + SZ 4-0 L27IN ABSRB UD L19MM PS-2 3/8 CIR MCP426H

## (undated) DEVICE — 3M™ STERI-STRIP™ REINFORCED ADHESIVE SKIN CLOSURES, R1547, 1/2 IN X 4 IN (12 MM X 100 MM), 6 STRIPS/ENVELOPE: Brand: 3M™ STERI-STRIP™

## (undated) DEVICE — APPLICATOR BNDG 1MM ADH PREMIERPRO EXOFIN

## (undated) DEVICE — CATH FOL TY IC BAG 16FR 2000ML -- CONVERT TO ITEM 363158

## (undated) DEVICE — SEALER ENDOSCP NANO COAT OPN DIV CRV L JAW LIGASURE IMPACT

## (undated) DEVICE — (D)PREP SKN CHLRAPRP APPL 26ML -- CONVERT TO ITEM 371833

## (undated) DEVICE — KENDALL SCD EXPRESS SLEEVES, KNEE LENGTH, MEDIUM: Brand: KENDALL SCD

## (undated) DEVICE — PENCIL ES L3M BTTN SWCH S STL HEX LOK BLDE ELECTRD HOLSTER